# Patient Record
Sex: FEMALE | Race: WHITE | NOT HISPANIC OR LATINO | Employment: FULL TIME | URBAN - METROPOLITAN AREA
[De-identification: names, ages, dates, MRNs, and addresses within clinical notes are randomized per-mention and may not be internally consistent; named-entity substitution may affect disease eponyms.]

---

## 2017-04-17 ENCOUNTER — HOSPITAL ENCOUNTER (OUTPATIENT)
Dept: RADIOLOGY | Age: 42
Discharge: HOME | End: 2017-04-17
Payer: COMMERCIAL

## 2017-04-17 DIAGNOSIS — Z15.01 GENETIC SUSCEPTIBILITY TO MALIGNANT NEOPLASM OF BREAST: ICD-10-CM

## 2017-04-17 DIAGNOSIS — C50.412 MALIGNANT NEOPLASM OF UPPER-OUTER QUADRANT OF LEFT FEMALE BREAST (HCC): ICD-10-CM

## 2017-04-17 PROCEDURE — 77080 DXA BONE DENSITY AXIAL: CPT

## 2017-04-21 ENCOUNTER — TRANSCRIBE ORDERS (OUTPATIENT)
Dept: ADMINISTRATIVE | Facility: HOSPITAL | Age: 42
End: 2017-04-21

## 2017-04-21 ENCOUNTER — ALLSCRIPTS OFFICE VISIT (OUTPATIENT)
Dept: OTHER | Facility: OTHER | Age: 42
End: 2017-04-21

## 2017-04-21 DIAGNOSIS — C50.912 MALIGNANT NEOPLASM OF LEFT FEMALE BREAST, UNSPECIFIED SITE OF BREAST: Primary | ICD-10-CM

## 2017-05-01 ENCOUNTER — ALLSCRIPTS OFFICE VISIT (OUTPATIENT)
Dept: OTHER | Facility: OTHER | Age: 42
End: 2017-05-01

## 2017-07-14 ENCOUNTER — APPOINTMENT (EMERGENCY)
Dept: CT IMAGING | Facility: HOSPITAL | Age: 42
End: 2017-07-14
Payer: COMMERCIAL

## 2017-07-14 ENCOUNTER — HOSPITAL ENCOUNTER (EMERGENCY)
Facility: HOSPITAL | Age: 42
Discharge: HOME | End: 2017-07-14
Attending: EMERGENCY MEDICINE | Admitting: EMERGENCY MEDICINE
Payer: COMMERCIAL

## 2017-07-14 VITALS
SYSTOLIC BLOOD PRESSURE: 144 MMHG | TEMPERATURE: 99 F | OXYGEN SATURATION: 99 % | DIASTOLIC BLOOD PRESSURE: 76 MMHG | HEART RATE: 74 BPM | RESPIRATION RATE: 18 BRPM | WEIGHT: 160 LBS
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
## 2017-07-14 DIAGNOSIS — R07.89 CHEST WALL PAIN: ICD-10-CM

## 2017-07-14 LAB
ANION GAP BLD CALC-SCNC: 18 MMOL/L (ref 4–13)
ATRIAL RATE: 76 BPM
BUN BLD-MCNC: 6 MG/DL (ref 5–25)
CA-I BLD-SCNC: 1.14 MMOL/L (ref 1.12–1.32)
CHLORIDE BLD-SCNC: 101 MMOL/L (ref 100–108)
CREAT BLD-MCNC: 0.7 MG/DL (ref 0.6–1.3)
GFR SERPL CREATININE-BSD FRML MDRD: >60 ML/MIN/1.73SQ M
GLUCOSE SERPL-MCNC: 103 MG/DL (ref 65–140)
HCT VFR BLD CALC: 41 % (ref 34.8–46.1)
HGB BLDA-MCNC: 13.9 G/DL (ref 11.5–15.4)
P AXIS: 50 DEGREES
PCO2 BLD: 26 MMOL/L (ref 21–32)
POTASSIUM BLD-SCNC: 3.9 MMOL/L (ref 3.5–5.3)
PR INTERVAL: 130 MS
QRS AXIS: 77 DEGREES
QRSD INTERVAL: 66 MS
QT INTERVAL: 394 MS
QTC INTERVAL: 443 MS
SODIUM BLD-SCNC: 140 MMOL/L (ref 136–145)
SPECIMEN SOURCE: ABNORMAL
T WAVE AXIS: 50 DEGREES
VENTRICULAR RATE: 76 BPM

## 2017-07-14 PROCEDURE — 80047 BASIC METABLC PNL IONIZED CA: CPT

## 2017-07-14 PROCEDURE — 93005 ELECTROCARDIOGRAM TRACING: CPT | Performed by: EMERGENCY MEDICINE

## 2017-07-14 PROCEDURE — 99284 EMERGENCY DEPT VISIT MOD MDM: CPT

## 2017-07-14 PROCEDURE — 85014 HEMATOCRIT: CPT

## 2017-07-14 PROCEDURE — 96374 THER/PROPH/DIAG INJ IV PUSH: CPT

## 2017-07-14 PROCEDURE — 71260 CT THORAX DX C+: CPT

## 2017-07-14 RX ORDER — KETOROLAC TROMETHAMINE 30 MG/ML
15 INJECTION, SOLUTION INTRAMUSCULAR; INTRAVENOUS ONCE
Status: COMPLETED | OUTPATIENT
Start: 2017-07-14 | End: 2017-07-14

## 2017-07-14 RX ORDER — NAPROXEN 500 MG/1
500 TABLET ORAL 2 TIMES DAILY WITH MEALS
Qty: 20 TABLET | Refills: 0 | Status: SHIPPED | OUTPATIENT
Start: 2017-07-14 | End: 2018-04-27

## 2017-07-14 RX ORDER — VENLAFAXINE HYDROCHLORIDE 75 MG/1
75 CAPSULE, EXTENDED RELEASE ORAL DAILY
COMMUNITY

## 2017-07-14 RX ORDER — ANASTROZOLE 1 MG/1
1 TABLET ORAL DAILY
COMMUNITY
End: 2018-07-09 | Stop reason: SDUPTHER

## 2017-07-14 RX ADMIN — IOHEXOL 85 ML: 350 INJECTION, SOLUTION INTRAVENOUS at 13:20

## 2017-07-14 RX ADMIN — KETOROLAC TROMETHAMINE 15 MG: 30 INJECTION, SOLUTION INTRAMUSCULAR at 12:42

## 2017-08-07 ENCOUNTER — ALLSCRIPTS OFFICE VISIT (OUTPATIENT)
Dept: OTHER | Facility: OTHER | Age: 42
End: 2017-08-07

## 2017-08-07 DIAGNOSIS — Z98.890 OTHER SPECIFIED POSTPROCEDURAL STATES: ICD-10-CM

## 2017-08-07 DIAGNOSIS — C50.412 MALIGNANT NEOPLASM OF UPPER-OUTER QUADRANT OF LEFT FEMALE BREAST (HCC): ICD-10-CM

## 2017-08-16 ENCOUNTER — TRANSCRIBE ORDERS (OUTPATIENT)
Dept: ADMINISTRATIVE | Facility: HOSPITAL | Age: 42
End: 2017-08-16

## 2017-08-16 DIAGNOSIS — S20.00XA: ICD-10-CM

## 2017-08-16 DIAGNOSIS — I74.4 ARTERY OF EXTREMITY, EMBOLISM AND THROMBOSIS (HCC): Primary | ICD-10-CM

## 2017-08-16 DIAGNOSIS — Z98.890 PERSONAL HISTORY OF BENIGN BREAST BIOPSY: ICD-10-CM

## 2017-08-16 DIAGNOSIS — C50.412 MALIGNANT NEOPLASM OF UPPER-OUTER QUADRANT OF LEFT FEMALE BREAST (HCC): ICD-10-CM

## 2017-08-24 ENCOUNTER — HOSPITAL ENCOUNTER (OUTPATIENT)
Dept: RADIOLOGY | Facility: HOSPITAL | Age: 42
Discharge: HOME | End: 2017-08-24
Attending: SURGERY
Payer: COMMERCIAL

## 2017-08-24 DIAGNOSIS — I74.4 ARTERY OF EXTREMITY, EMBOLISM AND THROMBOSIS (HCC): ICD-10-CM

## 2017-08-24 DIAGNOSIS — C50.412 MALIGNANT NEOPLASM OF UPPER-OUTER QUADRANT OF LEFT FEMALE BREAST (HCC): ICD-10-CM

## 2017-08-24 DIAGNOSIS — Z98.890 PERSONAL HISTORY OF BENIGN BREAST BIOPSY: ICD-10-CM

## 2017-08-24 DIAGNOSIS — S20.00XA: ICD-10-CM

## 2017-08-24 PROCEDURE — C8908 MRI W/O FOL W/CONT, BREAST,: HCPCS

## 2017-08-24 PROCEDURE — 0159T HB CAD BREAST MRI: CPT

## 2017-09-18 ENCOUNTER — GENERIC CONVERSION - ENCOUNTER (OUTPATIENT)
Dept: OTHER | Facility: OTHER | Age: 42
End: 2017-09-18

## 2017-10-20 ENCOUNTER — APPOINTMENT (OUTPATIENT)
Dept: RADIATION ONCOLOGY | Facility: CLINIC | Age: 42
End: 2017-10-20
Attending: RADIOLOGY
Payer: COMMERCIAL

## 2017-10-20 ENCOUNTER — GENERIC CONVERSION - ENCOUNTER (OUTPATIENT)
Dept: OTHER | Facility: OTHER | Age: 42
End: 2017-10-20

## 2017-10-20 PROCEDURE — 99214 OFFICE O/P EST MOD 30 MIN: CPT | Performed by: RADIOLOGY

## 2017-11-06 ENCOUNTER — GENERIC CONVERSION - ENCOUNTER (OUTPATIENT)
Dept: OTHER | Facility: OTHER | Age: 42
End: 2017-11-06

## 2017-12-04 ENCOUNTER — ALLSCRIPTS OFFICE VISIT (OUTPATIENT)
Dept: OTHER | Facility: OTHER | Age: 42
End: 2017-12-04

## 2017-12-06 NOTE — PROGRESS NOTES
Assessment    1  Encounter for routine gynecological examination () (Z86 079)    Discussion/Summary    1  Pap smear deferred due to low risk statusContinue to follow-up with her breast surgeon and oncologist as scheduledDiscussed menopausal symptoms  She will continue Effexor 75 mg daily  She also uses vaginal lubricant as neededReturn to office in 1 year  The patient has the current Goals: Continue preventative screening  The patent has the current Barriers: No barrier  Chief Complaint  annual visit      History of Present Illness  HPI: This is a 47-year-old female  who presents for her annual visit  She has no complaints today  She denies any vaginal bleeding or spotting  She continues to follow-up with her surgeon and medical oncologist  She was diagnosed with breast cancer in  and underwent bilateral mastectomy, chemotherapy and radiation  She is on Effexor for hot flashes which has improved significantly  Patient is sexually active and has been  for over 18 years  Review of Systems   Cardiovascular: no complaints of slow or fast heart rate, no chest pain, no palpitations, no leg claudication or lower extremity edema  Respiratory: no complaints of shortness of breath, no wheezing, no dyspnea on exertion, no orthopnea or PND  Breasts: as noted in HPI  Gastrointestinal: no complaints of abdominal pain, no constipation, no nausea or diarrhea, no vomiting, no bloody stools  Genitourinary: no complaints of dysuria, no incontinence, no pelvic pain, no dysmenorrhea, no vaginal discharge or abnormal vaginal bleeding  Over the past 2 weeks, how often have you been bothered by the following problems? 1 ) Little interest or pleasure in doing things? Not at all   2 ) Feeling down, depressed or hopeless? Not at all   3 ) Trouble falling asleep or sleeping too much? Not at all   4 ) Feeling tired or having little energy? Not at all   5 ) Poor appetite or overeating?  Not at all   6 ) Feeling bad about yourself, or that you are a failure, or have let yourself or your family down? Not at all   7 ) Trouble concentrating on things, such as reading a newspaper or watching television? Not at all   8 ) Moving or speaking so slowly that other people could have noticed, or the opposite, moving or speaking faster than usual? Not at all  How difficult have these problems made it for you to do your work, take care of things at home, or get along with people? Not at all  Score 0     ROS reviewed  OB History  Pregnancy History (Brief):  Prior pregnancies: : 4  Para:  Delivery type: 2 vaginal  Additional pregnancy history details: 2 miscarriage(s)       Active Problems    1  Aftercare involving the use of plastic surgery (V51 8) (Z09)   2  BRCA1 gene mutation positive (V84 01) (Z15 01,Z15 02)   3  Encounter for routine gynecological examination (V72 31) (Z01 419)   4  Female hypogonadism due to aromatase inhibitor therapy (256 39,E933 1) (D29 13,D69  1X5A)   5  Malignant neoplasm of upper-outer quadrant of left female breast (174 4) (C50 412)   6  Prophylactic use of anastrozole (Arimidex) (V07 52) (Z79 811)   7   S/P breast reconstruction, bilateral (V43 82) (B01 007)    Past Medical History   · History of Anxiety disorder (300 00) (F41 9)   · History of Encounter for breast reconstruction following mastectomy (V51 0) (Z42 1)   · History of Endometriosis (617 9) (N80 9)   · Denied: History of Exposure To STD   · History of chemotherapy (V87 41) (Z92 21)   · History of radiation therapy (V15 3) (Z92 3)   · History of Open Wound Of The Breast (879 0)   · History of Open Wound Of The Trunk (879 6)   · History of Oral contraceptive prescribed (V25 01) (Z30 011)   · History of Postoperative examination (V67 00) (Z09)   · History of Symptomatic menopausal or female climacteric states (627 2) (N95 1)   · History of Vaginal mass (625 8) (N89 9)    The active problems and past medical history were reviewed and updated today  Surgical History   · History of Breast Surgery Reconstruction   · History of Breast Surgery Reconstruction Bilateral   · History of Chemotherapeutics (V87 41)   · History of Lap Total Hysterect Uterus < 250g With Removal Of Tubes/Ovary   · History of Modified Radical Mastectomy Left Breast   · History of Simple Mastectomy Right Breast    The surgical history was reviewed and updated today  Family History  Mother    · Family history of Ovarian Cancer (V16 41)  Maternal Aunt    · Family history of Breast Cancer (V16 3)  Family History    · Family history of Breast Surgery Reconstruction With TRAM    The family history was reviewed and updated today  Social History     · Being A Social Drinker   · Denied: History of Drug Use   · Never A Smoker  The social history was reviewed and updated today  Current Meds   1  Anastrozole 1 MG Oral Tablet; take one tablet by mouth daily; Therapy: 85EIR1195 to (Evaluate:07Prp7270)  Requested for: 49MZB2316; Last Rx:15Jun2017 Ordered   2  Calcium TABS; Therapy: (Recorded:08Apr2013) to Recorded   3  Effexor TABS; Therapy: (Recorded:10Mar2015) to Recorded   4  Multi Complete CAPS; Therapy: (Recorded:08Apr2013) to Recorded   5  Vitamin D 1000 UNIT Oral Tablet; Therapy: (Recorded:08Apr2013) to Recorded    Allergies  1  Bactrim TABS    Vitals   Recorded: 11PVA4917 05:38HL   Systolic 741   Diastolic 72   Height 5 ft 6 in   Weight 160 lb    BMI Calculated 25 82   BSA Calculated 1 82       Physical Exam   Constitutional  General appearance: No acute distress, well appearing and well nourished  Pulmonary  Auscultation of lungs: Clear to auscultation  Cardiovascular  Auscultation of heart: Normal rate and rhythm, normal S1 and S2, no murmurs  Genitourinary  External genitalia: Normal and no lesions appreciated  Vagina: Abnormal   Vagina: atrophy  Urethral meatus: Normal    Cervix: Surgically absent  Uterus: Surgically absent  Adnexa/parametria: Surgically absent  Anus, perineum, and rectum: Normal sphincter tone, no masses, and no prolapse  Chest  Breasts: Abnormal  -- Extensive scarring noted bilaterally from prior surgery, bilateral silicone implants  Abdomen  Abdomen: Normal, non-tender, and no organomegaly noted  Future Appointments    Date/Time Provider Specialty Site   04/20/2018 09:00 AM CARL Parson   Hematology [facility]cology [facility]   05/07/2018 02:00  W  [address] Surgical Oncology [facility]       Signatures   Electronically signed by : Linette Vivas DO; Dec  4 2017  3:28PM EST                       (Author)

## 2018-01-13 VITALS
HEIGHT: 66 IN | DIASTOLIC BLOOD PRESSURE: 80 MMHG | WEIGHT: 161.2 LBS | SYSTOLIC BLOOD PRESSURE: 122 MMHG | OXYGEN SATURATION: 99 % | RESPIRATION RATE: 14 BRPM | BODY MASS INDEX: 25.91 KG/M2 | HEART RATE: 92 BPM

## 2018-01-13 VITALS
SYSTOLIC BLOOD PRESSURE: 128 MMHG | RESPIRATION RATE: 14 BRPM | OXYGEN SATURATION: 97 % | BODY MASS INDEX: 25.88 KG/M2 | HEART RATE: 99 BPM | HEIGHT: 66 IN | TEMPERATURE: 98.9 F | DIASTOLIC BLOOD PRESSURE: 84 MMHG | WEIGHT: 161 LBS

## 2018-01-13 VITALS
OXYGEN SATURATION: 99 % | WEIGHT: 156 LBS | HEART RATE: 80 BPM | HEIGHT: 66 IN | DIASTOLIC BLOOD PRESSURE: 86 MMHG | SYSTOLIC BLOOD PRESSURE: 132 MMHG | RESPIRATION RATE: 16 BRPM | TEMPERATURE: 97 F | BODY MASS INDEX: 25.07 KG/M2

## 2018-01-14 VITALS — WEIGHT: 162.38 LBS | HEIGHT: 66 IN | BODY MASS INDEX: 26.09 KG/M2

## 2018-01-22 VITALS
WEIGHT: 163.19 LBS | RESPIRATION RATE: 16 BRPM | HEART RATE: 72 BPM | SYSTOLIC BLOOD PRESSURE: 124 MMHG | BODY MASS INDEX: 26.23 KG/M2 | TEMPERATURE: 98.6 F | HEIGHT: 66 IN | DIASTOLIC BLOOD PRESSURE: 78 MMHG

## 2018-01-23 VITALS
WEIGHT: 160 LBS | DIASTOLIC BLOOD PRESSURE: 72 MMHG | BODY MASS INDEX: 25.71 KG/M2 | SYSTOLIC BLOOD PRESSURE: 122 MMHG | HEIGHT: 66 IN

## 2018-04-08 DIAGNOSIS — C50.919 BREAST CANCER (HCC): Primary | ICD-10-CM

## 2018-04-08 NOTE — PROGRESS NOTES
A critical potassium of 6 6 was called in for the patient  There was a note on the lab apparently that indicated it may have been exposed for a prolonged period of time/hemolyzed  This is what was verbally read back to me from [facility]   I called the patient and informed her I put in a stat CMP to be repeated

## 2018-04-09 ENCOUNTER — APPOINTMENT (OUTPATIENT)
Dept: LAB | Facility: MEDICAL CENTER | Age: 43
End: 2018-04-09
Payer: COMMERCIAL

## 2018-04-09 ENCOUNTER — TRANSCRIBE ORDERS (OUTPATIENT)
Dept: ADMINISTRATIVE | Facility: HOSPITAL | Age: 43
End: 2018-04-09

## 2018-04-09 DIAGNOSIS — Z17.0 MALIGNANT NEOPLASM OF BREAST IN FEMALE, ESTROGEN RECEPTOR POSITIVE, UNSPECIFIED LATERALITY, UNSPECIFIED SITE OF BREAST (HCC): ICD-10-CM

## 2018-04-09 DIAGNOSIS — C50.919 MALIGNANT NEOPLASM OF BREAST IN FEMALE, ESTROGEN RECEPTOR POSITIVE, UNSPECIFIED LATERALITY, UNSPECIFIED SITE OF BREAST (HCC): Primary | ICD-10-CM

## 2018-04-09 DIAGNOSIS — C50.919 MALIGNANT NEOPLASM OF BREAST IN FEMALE, ESTROGEN RECEPTOR POSITIVE, UNSPECIFIED LATERALITY, UNSPECIFIED SITE OF BREAST (HCC): ICD-10-CM

## 2018-04-09 DIAGNOSIS — Z17.0 MALIGNANT NEOPLASM OF BREAST IN FEMALE, ESTROGEN RECEPTOR POSITIVE, UNSPECIFIED LATERALITY, UNSPECIFIED SITE OF BREAST (HCC): Primary | ICD-10-CM

## 2018-04-09 LAB
ANION GAP SERPL CALCULATED.3IONS-SCNC: 2 MMOL/L (ref 4–13)
BUN SERPL-MCNC: 9 MG/DL (ref 5–25)
CALCIUM SERPL-MCNC: 9.2 MG/DL
CHLORIDE SERPL-SCNC: 104 MMOL/L (ref 100–108)
CO2 SERPL-SCNC: 33 MMOL/L (ref 21–32)
CREAT SERPL-MCNC: 0.75 MG/DL (ref 0.6–1.3)
GFR SERPL CREATININE-BSD FRML MDRD: 97 ML/MIN/1.73SQ M
GLUCOSE P FAST SERPL-MCNC: 86 MG/DL (ref 65–99)
POTASSIUM SERPL-SCNC: 4 MMOL/L (ref 3.5–5.3)
SODIUM SERPL-SCNC: 139 MMOL/L (ref 136–145)

## 2018-04-09 PROCEDURE — 36415 COLL VENOUS BLD VENIPUNCTURE: CPT

## 2018-04-09 PROCEDURE — 80048 BASIC METABOLIC PNL TOTAL CA: CPT

## 2018-04-13 ENCOUNTER — HOSPITAL ENCOUNTER (OUTPATIENT)
Dept: RADIOLOGY | Age: 43
Discharge: HOME | End: 2018-04-13
Payer: COMMERCIAL

## 2018-04-13 DIAGNOSIS — C50.912 MALIGNANT NEOPLASM OF LEFT FEMALE BREAST (HCC): ICD-10-CM

## 2018-04-13 PROCEDURE — 71260 CT THORAX DX C+: CPT

## 2018-04-13 PROCEDURE — 74177 CT ABD & PELVIS W/CONTRAST: CPT

## 2018-04-13 RX ADMIN — IOHEXOL 100 ML: 350 INJECTION, SOLUTION INTRAVENOUS at 08:53

## 2018-04-16 DIAGNOSIS — C50.412 MALIGNANT NEOPLASM OF UPPER-OUTER QUADRANT OF LEFT FEMALE BREAST (HCC): ICD-10-CM

## 2018-04-27 ENCOUNTER — OFFICE VISIT (OUTPATIENT)
Dept: HEMATOLOGY ONCOLOGY | Facility: CLINIC | Age: 43
End: 2018-04-27
Payer: COMMERCIAL

## 2018-04-27 VITALS
HEIGHT: 66 IN | WEIGHT: 158 LBS | TEMPERATURE: 97.5 F | SYSTOLIC BLOOD PRESSURE: 122 MMHG | OXYGEN SATURATION: 98 % | DIASTOLIC BLOOD PRESSURE: 82 MMHG | RESPIRATION RATE: 16 BRPM | BODY MASS INDEX: 25.39 KG/M2 | HEART RATE: 81 BPM

## 2018-04-27 DIAGNOSIS — Z17.0 MALIGNANT NEOPLASM OF LEFT BREAST IN FEMALE, ESTROGEN RECEPTOR POSITIVE, UNSPECIFIED SITE OF BREAST (HCC): Primary | ICD-10-CM

## 2018-04-27 DIAGNOSIS — C50.912 MALIGNANT NEOPLASM OF LEFT BREAST IN FEMALE, ESTROGEN RECEPTOR POSITIVE, UNSPECIFIED SITE OF BREAST (HCC): Primary | ICD-10-CM

## 2018-04-27 PROCEDURE — 99214 OFFICE O/P EST MOD 30 MIN: CPT | Performed by: INTERNAL MEDICINE

## 2018-04-27 NOTE — PROGRESS NOTES
Hematology / Oncology Outpatient Follow Up Note    Neeta Hauser 38 y o  female :1975 Q:[id number]         Date:  2018    Assessment / Plan:  A 58-year-old surgically postmenopausal woman with clinical stage IIIc locally advanced left breast cancer , grade 3, ER/CO positive HER-2 negative disease  She has BRCA 1 mutation  She had very good partial pathological response with neoadjuvant chemotherapy  She is status post bilateral mastectomy as well as prophylactic bilateral oophorectomy  She is currently on adjuvant hormonal therapy with anastrozole with no significant side effects  Based on her symptomatology, physical examination as well as imaging study, she has no evidence recurrent disease  I recommended her to continue with anastrozole 1 mg once a day  I will see her again in a year with CT scan of chest abdomen pelvis  We discussed genetic testing on her 2 daughters  They may undergo genetic test in between age of 19-31  She has 1 brother who has a daughter  He may be tested for BRCA gene mutation  She is in agreement with my recommendations  Subjective:     HPI:          Interval History:  A 35year old surgically postmenopausal woman with locally advanced left breast cancer , grade 3, ER/CO positive HER-2 negative disease  She had a clinical stage III C  disease with internal mammary lymph node metastasis based on a PET CT scan when she was first diagnosed  She underwent neoadjuvant chemotherapy with AC followed by paclitaxel resulting in very good partial response  She underwent mastectomy and lymph node dissection which showed small residual disease  She was on tamoxifen until she has a bilateral oophorectomy in 2013  This was done because she has BRCA-1 mutation  There was no evidence of malignancy in the ovaries  Afterwards she switched her hormonal treatment to anastrozole  She came in today for routine followup  She has no new complaint    She her hot flashes is minimal   She denied any bone pain  Her DEXA scan, a year ago was completely normal  She takes calcium vitamin D on a regular basis  Her weight is stable  She has no respiratory symptoms  Her performance status is normal       Objective:     Primary Diagnosis:    1  Locally-advanced left breast cancer, clinical stage IIIc disease, grade 3, ER/AL positive, HER2 negative disease, diagnosed in February of 2012  2  BRCA-1 mutation  Cancer Staging:  Cancer Staging  No matching staging information was found for the patient  Previous Hematologic/ Oncologic Treatment:     1  Neoadjuvant chemotherapy with dose-dense AC x4 followed by weekly paclitaxel, completed in late July 2012,   2  Mastectomy with lymph node dissection as well as right prophylactic mastectomy  3  Postmastectomy radiation therapy completed in November 2012  4  Adjuvant hormonal therapy with tamoxifen 20 mg once a day since October 2012 through early February 2013  5  Prophylactic bilateral oophorectomy and hysterectomy  Current Hematologic/ Oncologic Treatment:      Adjuvant hormonal therapy with anastrozole 1 mg once a day since February 2013  Disease Status:     1  Very good partial pathological response to the neoadjuvant chemotherapy  2  JENNIFER status post mastectomy and left axillary lymph node dissection, as well as prophylactic right mastectomy  3  Prophylactic bilateral oophorectomy in February 2013    Test Results:    Pathology:        Radiology:    CT scan of chest abdomen pelvis in April 2018 showed no evidence of metastatic disease  DEXA scan in April 2017 showed normal bone density  Laboratory:        Physical Exam:      General Appearance:    Alert, oriented        Eyes:    PERRL   Ears:    Normal external ear canals, both ears   Nose:   Nares normal, septum midline   Throat:   Mucosa moist  Pharynx without injection      Neck:   Supple       Lungs:     Clear to auscultation bilaterally   Chest Wall:    No tenderness or deformity    Heart:    Regular rate and rhythm       Abdomen:     Soft, non-tender, bowel sounds +, no organomegaly           Extremities:   Extremities no cyanosis or edema       Skin:   no rash or icterus  Lymph nodes:   Cervical, supraclavicular, and axillary nodes normal   Neurologic:   CNII-XII intact, normal strength, sensation and reflexes     Throughout          Breast exam:   status post bilateral mastectomy with reconstruction  No palpable abnormality in either reconstructed breast or chest wall  ROS: Review of Systems   All other systems reviewed and are negative  Imaging: Ct Chest Abdomen Pelvis W Contrast    Result Date: 4/16/2018  Narrative: CT CHEST, ABDOMEN AND PELVIS WITH IV CONTRAST INDICATION:   History of left breast cancer with bilateral mastectomy chemotherapy and radiation therapy  COMPARISON: CT chest 7/14/2017  CT chest abdomen pelvis 3/31/2016  TECHNIQUE: CT examination of the chest, abdomen and pelvis was performed  Axial, sagittal, and coronal 2D reformatted images were created from the source data and submitted for interpretation  Radiation dose length product (DLP) for this visit:  493 mGy-cm   This examination, like all CT scans performed in the [facility], was performed utilizing techniques to minimize radiation dose exposure, including the use of iterative reconstruction and automated exposure control  IV Contrast:  100 mL of iohexol (OMNIPAQUE)   350 Enteric Contrast: Enteric contrast was administered  FINDINGS: CHEST LUNGS:  Apical and anterior left upper lobe scarring likely secondary to prior radiation therapy no suspicious pulmonary nodules or acute pulmonary findings  No endotracheal or endobronchial lesion  PLEURA:  Unremarkable  HEART/GREAT VESSELS:  Unremarkable for patient's age  MEDIASTINUM AND CARMEL:  Unremarkable  CHEST WALL AND LOWER NECK:   Bilateral breast prostheses  Left axillary surgical dissection clips  ABDOMEN LIVER/BILIARY TREE:  Fatty infiltration  GALLBLADDER:  There are gallstone(s) within the gallbladder, without pericholecystic inflammatory changes  SPLEEN:  Unremarkable  PANCREAS:  Unremarkable  ADRENAL GLANDS:  Unremarkable  KIDNEYS/URETERS:  Unremarkable  No hydronephrosis  STOMACH AND BOWEL:  Distal colonic diverticulosis without acute diverticulitis  APPENDIX:  No findings to suggest appendicitis  ABDOMINOPELVIC CAVITY:  No ascites or free intraperitoneal air  No lymphadenopathy  VESSELS:  Unremarkable for patient's age  PELVIS REPRODUCTIVE ORGANS:  Hysterectomy  URINARY BLADDER:  Unremarkable  ABDOMINAL WALL/INGUINAL REGIONS:  Nodularity throughout the right anterior rectus abdominis musculature slightly less prominent when compared with the CT abdomen pelvis from 2 years earlier OSSEOUS STRUCTURES:  No acute fracture or destructive osseous lesion  Impression: No signs of metastatic disease to the chest abdomen or pelvis  Cholelithiasis without cholecystitis  Distal colonic diverticulosis without acute diverticulitis  Slightly less prominent right anterior rectus abdominis musculature nodularity when compared with a 2016 study  Fatty infiltration of the liver  Right apical and anterior left upper lobe scarring likely on a postradiation basis   Workstation performed: [id number]         Labs:   Lab Results   Component Value Date    WBC 5 07 08/12/2014    HGB 13 9 07/14/2017    HCT 41 3 08/12/2014    MCV 93 08/12/2014     08/12/2014     Lab Results   Component Value Date     04/09/2018    K 4 0 04/09/2018     04/09/2018    CO2 33 (H) 04/09/2018    ANIONGAP 2 (L) 04/09/2018    BUN 9 04/09/2018    CREATININE 0 75 04/09/2018    GLUCOSE 103 07/14/2017    GLUF 86 04/09/2018    CALCIUM 9 2 04/09/2018    AST 22 09/17/2013    ALT 32 09/17/2013    ALKPHOS 67 09/17/2013    PROT 8 2 09/17/2013    BILITOT 0 6 09/17/2013    EGFR 97 04/09/2018         Current Medications: Reviewed  Allergies: Reviewed  PMH/FH/SH:  Reviewed      Vital Sign:    Body surface area is 1 81 meters squared      Wt Readings from Last 3 Encounters:   04/27/18 71 7 kg (158 lb)   12/04/17 72 6 kg (160 lb)   11/06/17 74 kg (163 lb 3 oz)        Temp Readings from Last 3 Encounters:   04/27/18 97 5 °F (36 4 °C) (Tympanic)   11/06/17 98 6 °F (37 °C)   07/14/17 99 °F (37 2 °C) (Temporal)        BP Readings from Last 3 Encounters:   04/27/18 122/82   12/04/17 122/72   11/06/17 124/78         Pulse Readings from Last 3 Encounters:   04/27/18 81   11/06/17 72   10/20/17 92     @LASTSAO2(3)@

## 2018-05-07 PROBLEM — C50.412 MALIGNANT NEOPLASM OF UPPER-OUTER QUADRANT OF LEFT BREAST IN FEMALE, ESTROGEN RECEPTOR POSITIVE (HCC): Status: ACTIVE | Noted: 2018-04-27

## 2018-05-09 ENCOUNTER — OFFICE VISIT (OUTPATIENT)
Dept: SURGICAL ONCOLOGY | Facility: CLINIC | Age: 43
End: 2018-05-09
Payer: COMMERCIAL

## 2018-05-09 VITALS
RESPIRATION RATE: 12 BRPM | HEIGHT: 66 IN | HEART RATE: 80 BPM | WEIGHT: 159 LBS | TEMPERATURE: 98.3 F | DIASTOLIC BLOOD PRESSURE: 90 MMHG | SYSTOLIC BLOOD PRESSURE: 130 MMHG | BODY MASS INDEX: 25.55 KG/M2

## 2018-05-09 DIAGNOSIS — N63.20 LEFT BREAST LUMP: ICD-10-CM

## 2018-05-09 DIAGNOSIS — C50.412 MALIGNANT NEOPLASM OF UPPER-OUTER QUADRANT OF LEFT BREAST IN FEMALE, ESTROGEN RECEPTOR POSITIVE (HCC): Primary | ICD-10-CM

## 2018-05-09 DIAGNOSIS — Z17.0 MALIGNANT NEOPLASM OF UPPER-OUTER QUADRANT OF LEFT BREAST IN FEMALE, ESTROGEN RECEPTOR POSITIVE (HCC): Primary | ICD-10-CM

## 2018-05-09 PROCEDURE — 99213 OFFICE O/P EST LOW 20 MIN: CPT | Performed by: NURSE PRACTITIONER

## 2018-05-09 NOTE — PROGRESS NOTES
Surgical Oncology Follow Up       [address]  [facility] [address]    Melania Brown  1975  [id number]  [address] [facility] [facility]  [facility] SURGICAL ONCOLOGY [facility] [address]    Chief Complaint   Patient presents with    Breast Cancer     6 month follow up       Assessment/Plan:  1  Malignant neoplasm of upper-outer quadrant of left breast in female, estrogen receptor positive ([address] )  - 6 mo follow up visit    2  Left breast lump  - US breast left limited (diagnostic); Future      Discussion/Summary: Patient is a pleasant 38year old female who presents today for a 6 month follow-up visit for left breast cancer diagnosed in March 2012  Her pathology revealed invasive ductal carcinoma, ER 60-70%, NV 35-40%, HER-2 negative  She completed neoadjuvant chemotherapy  She was also found to be positive for the BRCA1 genetic mutation  Therefore, she underwent a bilateral mastectomy, SNB in August 2012 by Dr Anitha Ramos  She did have 1/12 positive lymph nodes  This was high risk on Mammaprint  She did have reconstruction performed by Dr Samnatha Barraza  She then completed postmastectomy radiation therapy  She is currently taking anastrozole  She has also had a prophylactic bilateral oophorectomy  She has no new complaints today- denies headaches, back pain, bone pain, cough, SOB, abdominal pain  On exam, there are some nodular areas along the superior aspect of the left TRAM reconstructed breast  I suspect these areas are scar tissue vs fat necrosis  Examined by Dr Anitha Ramos as well  We will obtain an u/s for further evaluation  Assuming the imaging shows no worrisome findings, we will plan to see the patient back in 6 months, or sooner if the need arises  She was instructed to call with any new concerns or symptoms  All of her questions were answered      History of Present Illness:        Malignant neoplasm of upper-outer quadrant of left breast in female, estrogen receptor positive ([facility] [address] )    2012 Initial Diagnosis     Malignant neoplasm of upper-outer quadrant of left breast in female, estrogen receptor positive ([facility] [address] )    Left breast biopsy  Invasive ductal carcinoma  ER 60-70%  W[place] 35-40%  HER-2 negative            Genomic Testing     Mammaprint High Risk         2012 Genetic Testing     POSITIVE BRCA 1 mutation          - 2012 Chemotherapy     Neoadjuvant chemotherapy with dose-dense AC x4 followed by weekly paclitaxel (Dr Verlena Apgar)         2012 Surgery     Bilateral mastectomies, Bilateral sentinel lymph node biopsy, Left axillary dissection, Removal port-a-cath (Dr Kristen Rm)          - 2012 Radiation     Postmastectomy radiation therapy (Dr Yuly Brito)         10/2012 -  Hormone Therapy     Adjuvant hormonal therapy with tamoxifen 20 mg once a day since 2012 through early 2013  Anastrozole 2013- present         2013 Surgery     Prophylactic bilateral oophorectomy and hysterectomy  (Dr Suzi Ponce)             -Interval History: Patient presents today for a 6 month follow-up visit for left breast cancer diagnosed in 2012  She had a CT scan of the chest abdomen and pelvis performed in 2018 which revealed no evidence of metastatic disease  She has no new complaints today- denies headaches, back pain, bone pain, cough, SOB, abdominal pain  Review of Systems:  Review of Systems   Constitutional: Negative for activity change, appetite change, chills, fatigue, fever and unexpected weight change  HENT: Negative for trouble swallowing  Eyes: Negative for pain, redness and visual disturbance  Respiratory: Negative for cough, shortness of breath and wheezing  Cardiovascular: Negative for chest pain, palpitations and leg swelling  Gastrointestinal: Negative for abdominal pain, constipation, diarrhea, nausea and vomiting     Endocrine: Negative for cold intolerance and heat intolerance  Musculoskeletal: Negative for arthralgias, back pain, gait problem and myalgias  Skin: Negative for color change and rash  Neurological: Negative for dizziness, syncope, light-headedness, numbness and headaches  Hematological: Negative for adenopathy  Psychiatric/Behavioral: Negative for agitation and confusion  All other systems reviewed and are negative  Patient Active Problem List   Diagnosis    Malignant neoplasm of upper-outer quadrant of left breast in female, estrogen receptor positive ([facility] )    Abnormal glucose    Anxiety state    Female hypogonadism due to aromatase inhibitor therapy    Leukocytopenia    Migraine without aura    Preventative health care    Malignant neoplasm of breast (female) [facility])     Past Medical History:   Diagnosis Date    Breast cancer ([facility] )      Past Surgical History:   Procedure Laterality Date    HYSTERECTOMY      MASTECTOMY      RECONSTRUCTION BREAST W/ TRAM FLAP       Family History   Problem Relation Age of Onset    Ovarian cancer Mother     Breast cancer Maternal Aunt      Social History     Social History    Marital status: /Civil Union     Spouse name: N/A    Number of children: N/A    Years of education: N/A     Occupational History    Not on file       Social History Main Topics    Smoking status: Former Smoker    Smokeless tobacco: Not on file    Alcohol use Yes    Drug use: No    Sexual activity: Not on file     Other Topics Concern    Not on file     Social History Narrative    No narrative on file       Current Outpatient Prescriptions:     anastrozole (ARIMIDEX) 1 mg tablet, Take 1 mg by mouth daily, Disp: , Rfl:     CALCIUM PO, Take 1,000 Units by mouth daily, Disp: , Rfl:     cholecalciferol (VITAMIN D3) 1,000 units tablet, Take 1,000 Units by mouth daily, Disp: , Rfl:     Multiple Vitamins-Minerals (MULTI COMPLETE PO), Take 1 tablet by mouth daily, Disp: , Rfl:     venlafaxine (EFFEXOR XR) 75 mg 24 hr capsule, Take 75 mg by mouth daily, Disp: , Rfl:   Allergies   Allergen Reactions    Bactrim [Sulfamethoxazole-Trimethoprim] Rash     Other reaction(s): rash     Vitals:    05/09/18 1317   BP: 130/90   Pulse: 80   Resp: 12   Temp: 98 3 °F (36 8 °C)       Physical Exam   Constitutional: She is oriented to person, place, and time  Vital signs are normal  She appears well-developed and well-nourished  No distress  HENT:   Head: Normocephalic and atraumatic  Neck: Normal range of motion  Cardiovascular: Normal rate, regular rhythm and normal heart sounds  Pulmonary/Chest: Effort normal and breath sounds normal    Right reconstructed breast without masses or skin nodules  Three nodular areas noted at the superior aspect (12:00) of left TRAM reconstructed breast along the incision  I suspect this is scar tissue vs fat necrosis  There is no supraclavicular or axillary lymphadenopathy  Examined by myself and Dr Elyssa Emerson  Abdominal: Soft  Normal appearance  She exhibits no mass  There is no hepatosplenomegaly  There is no tenderness  Musculoskeletal: Normal range of motion  Lymphadenopathy:     She has no axillary adenopathy  Right: No supraclavicular adenopathy present  Left: No supraclavicular adenopathy present  Neurological: She is alert and oriented to person, place, and time  Skin: Skin is warm, dry and intact  No rash noted  She is not diaphoretic  Psychiatric: She has a normal mood and affect  Her speech is normal    Vitals reviewed  Results:    Imaging  Ct Chest Abdomen Pelvis W Contrast    Result Date: 4/16/2018  Narrative: CT CHEST, ABDOMEN AND PELVIS WITH IV CONTRAST INDICATION:   History of left breast cancer with bilateral mastectomy chemotherapy and radiation therapy  COMPARISON: CT chest 7/14/2017  CT chest abdomen pelvis 3/31/2016  TECHNIQUE: CT examination of the chest, abdomen and pelvis was performed   Axial, sagittal, and coronal 2D reformatted images were created from the source data and submitted for interpretation  Radiation dose length product (DLP) for this visit:  493 mGy-cm   This examination, like all CT scans performed in the [facility], was performed utilizing techniques to minimize radiation dose exposure, including the use of iterative reconstruction and automated exposure control  IV Contrast:  100 mL of iohexol (OMNIPAQUE)   350 Enteric Contrast: Enteric contrast was administered  FINDINGS: CHEST LUNGS:  Apical and anterior left upper lobe scarring likely secondary to prior radiation therapy no suspicious pulmonary nodules or acute pulmonary findings  No endotracheal or endobronchial lesion  PLEURA:  Unremarkable  HEART/GREAT VESSELS:  Unremarkable for patient's age  MEDIASTINUM AND CARMEL:  Unremarkable  CHEST WALL AND LOWER NECK:   Bilateral breast prostheses  Left axillary surgical dissection clips  ABDOMEN LIVER/BILIARY TREE:  Fatty infiltration  GALLBLADDER:  There are gallstone(s) within the gallbladder, without pericholecystic inflammatory changes  SPLEEN:  Unremarkable  PANCREAS:  Unremarkable  ADRENAL GLANDS:  Unremarkable  KIDNEYS/URETERS:  Unremarkable  No hydronephrosis  STOMACH AND BOWEL:  Distal colonic diverticulosis without acute diverticulitis  APPENDIX:  No findings to suggest appendicitis  ABDOMINOPELVIC CAVITY:  No ascites or free intraperitoneal air  No lymphadenopathy  VESSELS:  Unremarkable for patient's age  PELVIS REPRODUCTIVE ORGANS:  Hysterectomy  URINARY BLADDER:  Unremarkable  ABDOMINAL WALL/INGUINAL REGIONS:  Nodularity throughout the right anterior rectus abdominis musculature slightly less prominent when compared with the CT abdomen pelvis from 2 years earlier OSSEOUS STRUCTURES:  No acute fracture or destructive osseous lesion  Impression: No signs of metastatic disease to the chest abdomen or pelvis  Cholelithiasis without cholecystitis   Distal colonic diverticulosis without acute diverticulitis  Slightly less prominent right anterior rectus abdominis musculature nodularity when compared with a 2016 study  Fatty infiltration of the liver  Right apical and anterior left upper lobe scarring likely on a postradiation basis  Workstation performed: [id number]       I reviewed the above imaging data  Advance Care Planning/Advance Directives:  Discussed disease status, cancer treatment plans and/or cancer treatment goals with the patient

## 2018-05-11 ENCOUNTER — HOSPITAL ENCOUNTER (OUTPATIENT)
Dept: ULTRASOUND IMAGING | Facility: CLINIC | Age: 43
Discharge: HOME | End: 2018-05-11
Payer: COMMERCIAL

## 2018-05-11 DIAGNOSIS — N63.20 LEFT BREAST LUMP: ICD-10-CM

## 2018-05-11 PROCEDURE — 76642 ULTRASOUND BREAST LIMITED: CPT

## 2018-07-09 DIAGNOSIS — Z17.0 MALIGNANT NEOPLASM OF BREAST IN FEMALE, ESTROGEN RECEPTOR POSITIVE, UNSPECIFIED LATERALITY, UNSPECIFIED SITE OF BREAST (HCC): Primary | ICD-10-CM

## 2018-07-09 DIAGNOSIS — C50.919 MALIGNANT NEOPLASM OF BREAST IN FEMALE, ESTROGEN RECEPTOR POSITIVE, UNSPECIFIED LATERALITY, UNSPECIFIED SITE OF BREAST (HCC): Primary | ICD-10-CM

## 2018-07-09 RX ORDER — ANASTROZOLE 1 MG/1
TABLET ORAL
Qty: 90 TABLET | Refills: 1 | Status: SHIPPED | OUTPATIENT
Start: 2018-07-09 | End: 2019-01-31 | Stop reason: SDUPTHER

## 2018-10-01 ENCOUNTER — OFFICE VISIT (OUTPATIENT)
Dept: PLASTIC SURGERY | Facility: CLINIC | Age: 43
End: 2018-10-01
Payer: COMMERCIAL

## 2018-10-01 VITALS — WEIGHT: 156 LBS | HEIGHT: 66 IN | BODY MASS INDEX: 25.07 KG/M2

## 2018-10-01 DIAGNOSIS — Z42.1 AFTERCARE POSTMASTECTOMY FOR BREAST RECONSTRUCTION: Primary | ICD-10-CM

## 2018-10-01 PROCEDURE — 99214 OFFICE O/P EST MOD 30 MIN: CPT | Performed by: SURGERY

## 2018-10-01 NOTE — PROGRESS NOTES
Assessment/Plan:  Please see HPI  Overall, she is quite pleased with the results, and is content to deferred additional procedures, at least for the time being  From anesthetic standpoint, there is some hollowing of the upper pole of the left breast, as expected, this may benefit from us some autologous fat grafting  She also may benefit from revision of the abdominal donor site scar at some point  Given that she is content as is, at least for the time being, we will see her again in 1 year  If she is interested in proceeding with any revisional procedures prior to that she will give me a call and we will make the arrangements  There are no diagnoses linked to this encounter  Subjective: Follow-up visit     Patient ID: Nora Andino is a 37 y o  female  HPI she presents today in follow-up, status post left breast reconstruction with a tram flap/implant, and right breast implant    The following portions of the patient's history were reviewed and updated as appropriate: allergies, current medications, past family history, past medical history, past social history, past surgical history and problem list     Review of Systems   Constitutional: Negative for chills and fever  HENT: Negative for hearing loss  Eyes: Negative for discharge and visual disturbance  Respiratory: Negative for chest tightness and shortness of breath  Cardiovascular: Negative for chest pain and leg swelling  Gastrointestinal: Negative for constipation, diarrhea and nausea  Genitourinary: Negative for dysuria  Musculoskeletal: Negative for gait problem  Skin: Negative for rash  Allergic/Immunologic: Negative for immunocompromised state  Neurological: Negative for seizures and headaches  Hematological: Does not bruise/bleed easily  Psychiatric/Behavioral: Negative for dysphoric mood  The patient is not nervous/anxious            Objective:      Ht 5' 6" (1 676 m)   Wt 70 8 kg (156 lb)   BMI 25 18 kg/m²          Physical Exam   Constitutional: She appears well-developed  HENT:   Head: Normocephalic  Eyes: Pupils are equal, round, and reactive to light  Neck: Normal range of motion  Pulmonary/Chest: Effort normal    Abdominal: Soft  Musculoskeletal: Normal range of motion  Neurological: She is alert  Skin: Skin is warm     Well-healed left breast tram flap, bilateral breast implants, both breasts are soft, supple and without evidence of capsular contracture

## 2018-10-01 NOTE — LETTER
October 1, 2018     Dominique Liu MD  [address] [place] [id number]    Patient: Nel Cowan   YOB: 1975   Date of Visit: 10/1/2018       Dear Dr Karry Phoenix:    Thank you for referring Nel Cowan to me for evaluation  Below are my notes for this consultation  If you have questions, please do not hesitate to call me  I look forward to following your patient along with you  Sincerely,        Morales Fitzpatrick MD        CC: No Recipients  Morales Fitzpatrick MD  10/1/2018  4:35 PM  Sign at close encounter  Assessment/Plan:  Please see HPI  Overall, she is quite pleased with the results, and is content to deferred additional procedures, at least for the time being  From anesthetic standpoint, there is some hollowing of the upper pole of the left breast, as expected, this may benefit from us some autologous fat grafting  She also may benefit from revision of the abdominal donor site scar at some point  Given that she is content as is, at least for the time being, we will see her again in 1 year  If she is interested in proceeding with any revisional procedures prior to that she will give me a call and we will make the arrangements  There are no diagnoses linked to this encounter  Subjective: Follow-up visit     Patient ID: Nel Cowan is a 37 y o  female  HPI she presents today in follow-up, status post left breast reconstruction with a tram flap/implant, and right breast implant    The following portions of the patient's history were reviewed and updated as appropriate: allergies, current medications, past family history, past medical history, past social history, past surgical history and problem list     Review of Systems   Constitutional: Negative for chills and fever  HENT: Negative for hearing loss  Eyes: Negative for discharge and visual disturbance     Respiratory: Negative for chest tightness and shortness of breath  Cardiovascular: Negative for chest pain and leg swelling  Gastrointestinal: Negative for constipation, diarrhea and nausea  Genitourinary: Negative for dysuria  Musculoskeletal: Negative for gait problem  Skin: Negative for rash  Allergic/Immunologic: Negative for immunocompromised state  Neurological: Negative for seizures and headaches  Hematological: Does not bruise/bleed easily  Psychiatric/Behavioral: Negative for dysphoric mood  The patient is not nervous/anxious  Objective:      Ht 5' 6" (1 676 m)   Wt 70 8 kg (156 lb)   BMI 25 18 kg/m²           Physical Exam   Constitutional: She appears well-developed  HENT:   Head: Normocephalic  Eyes: Pupils are equal, round, and reactive to light  Neck: Normal range of motion  Pulmonary/Chest: Effort normal    Abdominal: Soft  Musculoskeletal: Normal range of motion  Neurological: She is alert  Skin: Skin is warm     Well-healed left breast tram flap, bilateral breast implants, both breasts are soft, supple and without evidence of capsular contracture

## 2018-11-09 ENCOUNTER — OFFICE VISIT (OUTPATIENT)
Dept: SURGICAL ONCOLOGY | Facility: CLINIC | Age: 43
End: 2018-11-09
Payer: COMMERCIAL

## 2018-11-09 VITALS
TEMPERATURE: 98.5 F | HEIGHT: 66 IN | DIASTOLIC BLOOD PRESSURE: 80 MMHG | HEART RATE: 81 BPM | SYSTOLIC BLOOD PRESSURE: 110 MMHG | RESPIRATION RATE: 18 BRPM | BODY MASS INDEX: 25.07 KG/M2 | WEIGHT: 156 LBS

## 2018-11-09 DIAGNOSIS — Z15.09 BRCA1 GENE MUTATION POSITIVE: ICD-10-CM

## 2018-11-09 DIAGNOSIS — Z17.0 MALIGNANT NEOPLASM OF UPPER-OUTER QUADRANT OF LEFT BREAST IN FEMALE, ESTROGEN RECEPTOR POSITIVE (HCC): Primary | ICD-10-CM

## 2018-11-09 DIAGNOSIS — C50.412 MALIGNANT NEOPLASM OF UPPER-OUTER QUADRANT OF LEFT BREAST IN FEMALE, ESTROGEN RECEPTOR POSITIVE (HCC): Primary | ICD-10-CM

## 2018-11-09 DIAGNOSIS — Z15.01 BRCA1 GENE MUTATION POSITIVE: ICD-10-CM

## 2018-11-09 PROCEDURE — 99213 OFFICE O/P EST LOW 20 MIN: CPT | Performed by: NURSE PRACTITIONER

## 2018-11-09 NOTE — PROGRESS NOTES
Surgical Oncology Follow Up       [address] [facility] SURGICAL ONCOLOGY [place]  [address] [facility] [address]    Miladis Avendano  1975  [id number]  [facility] SURGICAL ONCOLOGY [place]  [address] [address]    Chief Complaint   Patient presents with    Breast Cancer     Pt is here for 6 month follow up        Assessment/Plan:  1  Malignant neoplasm of upper-outer quadrant of left breast in female, estrogen receptor positive ([address] )  - 6 mo f/u visit    2  BRCA1 gene mutation positive  - 6 mo f/u visit       Discussion/Summary: Patient is a pleasant 37year old female who presents today for a 6 month follow-up visit for left breast cancer diagnosed in March 2012  Her pathology revealed invasive ductal carcinoma, ER 60-70%, CA 35-40%, HER-2 negative  She completed neoadjuvant chemotherapy  She was also found to be positive for the BRCA1 genetic mutation  Therefore, she underwent a bilateral mastectomy, SNB in August 2012 by Dr Vivian Morales  She did have 1/12 positive lymph nodes  This was high risk on Mammaprint  She did have reconstruction performed by Dr Graeme Barrios  She then completed postmastectomy radiation therapy  She is currently taking anastrozole  She had a prophylactic bilateral oophorectomy  She has no new complaints today- denies headaches, back pain, bone pain, cough, SOB, abdominal pain  On exam, there are stable nodular areas along the superior aspect of the left TRAM reconstructed breast- an u/s was performed of this area in May 2018 revealing near simple cysts  Patient doesn't believe that they are changed in size and they are not bothersome to her  Instructed her to monitor and call with changes  We will plan to see the patient back in 6 months, or sooner if the need arises  She was instructed to call with any new concerns or symptoms  All of her questions were answered      History of Present Illness:        Malignant neoplasm of upper-outer quadrant of left breast in female, estrogen receptor positive ([facility] [address] )    2/1/2012 Initial Diagnosis     Malignant neoplasm of upper-outer quadrant of left breast in female, estrogen receptor positive ([facility] [address] )    Left breast biopsy  Invasive ductal carcinoma  ER 60-70%  [place] 35-40%  HER-2 negative            Genomic Testing     Mammaprint High Risk         2/9/2012 Genetic Testing     POSITIVE BRCA 1 mutation          - 7/2012 Chemotherapy     Neoadjuvant chemotherapy with dose-dense AC x4 followed by weekly paclitaxel (Dr Agustina Loco)         8/28/2012 Surgery     Bilateral mastectomies, Bilateral sentinel lymph node biopsy, Left axillary dissection, Removal port-a-cath (Dr Lizet Perry)          - 11/2012 Radiation     Postmastectomy radiation therapy (Dr Fernand Hashimoto)         10/2012 -  Hormone Therapy     Adjuvant hormonal therapy with tamoxifen 20 mg once a day since October 2012 through early February 2013  Anastrozole February 2013- present         2/1/2013 Surgery     Prophylactic bilateral oophorectomy and hysterectomy  (Dr Ramya Goyal)             -Interval History: Patient presents today for a 6 mo f/u visit for left breast cancer diagnosed in 2012  She has no new complaints today  Notices no changes on self exam  Recently met with Dr Anabela Dickens and is not planning on pursuing any further reconstruction at this time  Denies headaches, back pain, bone pain, cough, SOB, abdominal pain  She continues to take Anastrozole  Review of Systems:  Review of Systems   Constitutional: Negative for activity change, appetite change, chills, fatigue, fever and unexpected weight change  HENT: Negative for trouble swallowing  Eyes: Negative for pain, redness and visual disturbance  Respiratory: Negative for cough, shortness of breath and wheezing  Cardiovascular: Negative for chest pain, palpitations and leg swelling  Gastrointestinal: Negative for abdominal pain, constipation, diarrhea, nausea and vomiting  Endocrine: Negative for cold intolerance and heat intolerance  Musculoskeletal: Negative for arthralgias, back pain, gait problem and myalgias  Skin: Negative for color change and rash  Neurological: Negative for dizziness, syncope, light-headedness, numbness and headaches  Hematological: Negative for adenopathy  Psychiatric/Behavioral: Negative for agitation and confusion  All other systems reviewed and are negative  Patient Active Problem List   Diagnosis    Malignant neoplasm of upper-outer quadrant of left breast in female, estrogen receptor positive ([place] [address] )    Abnormal glucose    Anxiety state    Female hypogonadism due to aromatase inhibitor therapy    Leukocytopenia    Migraine without aura    Preventative health care    Malignant neoplasm of breast (female) ([place] [address] )    BRCA1 gene mutation positive     Past Medical History:   Diagnosis Date    Breast cancer ([address] [address] )      Past Surgical History:   Procedure Laterality Date    HYSTERECTOMY      MASTECTOMY      RECONSTRUCTION BREAST W/ TRAM FLAP       Family History   Problem Relation Age of Onset    Ovarian cancer Mother     Breast cancer Maternal Aunt      Social History     Social History    Marital status: /Civil Union     Spouse name: N/A    Number of children: N/A    Years of education: N/A     Occupational History    Not on file       Social History Main Topics    Smoking status: Former Smoker    Smokeless tobacco: Never Used    Alcohol use Yes    Drug use: No    Sexual activity: Not on file     Other Topics Concern    Not on file     Social History Narrative    No narrative on file       Current Outpatient Prescriptions:     anastrozole (ARIMIDEX) 1 mg tablet, TAKE ONE TABLET BY MOUTH ONCE DAILY, Disp: 90 tablet, Rfl: 1    CALCIUM PO, Take 1,000 Units by mouth daily, Disp: , Rfl:     cholecalciferol (VITAMIN D3) 1,000 units tablet, Take 1,000 Units by mouth daily, Disp: , Rfl:     Multiple Vitamins-Minerals (MULTI COMPLETE PO), Take 1 tablet by mouth daily, Disp: , Rfl:     venlafaxine (EFFEXOR XR) 75 mg 24 hr capsule, Take 75 mg by mouth daily, Disp: , Rfl:   Allergies   Allergen Reactions    Bactrim [Sulfamethoxazole-Trimethoprim] Rash     Other reaction(s): rash     Vitals:    11/09/18 0841   BP: 110/80   Pulse: 81   Resp: 18   Temp: 98 5 °F (36 9 °C)       Physical Exam   Constitutional: She is oriented to person, place, and time  Vital signs are normal  She appears well-developed and well-nourished  No distress  HENT:   Head: Normocephalic and atraumatic  Neck: Normal range of motion  Cardiovascular: Normal rate, regular rhythm and normal heart sounds  Pulmonary/Chest: Effort normal and breath sounds normal    Right reconstructed breast without masses or skin nodules  Three nodular areas noted at the superior aspect (12:00) of left TRAM reconstructed breast along the incision- these are stable  U/s of this area performed on 5/11/18- near simple cysts noted  Patient does not appreciate any changes and they are not bothersome to her  There is no supraclavicular or axillary lymphadenopathy  Examined by myself and Dr Kaela Ferguson  Abdominal: Soft  Normal appearance  She exhibits no mass  There is no hepatosplenomegaly  There is no tenderness  Musculoskeletal: Normal range of motion  Lymphadenopathy:     She has no axillary adenopathy  Right: No supraclavicular adenopathy present  Left: No supraclavicular adenopathy present  Neurological: She is alert and oriented to person, place, and time  Skin: Skin is warm, dry and intact  No rash noted  She is not diaphoretic  Psychiatric: She has a normal mood and affect  Her speech is normal    Vitals reviewed  Advance Care Planning/Advance Directives:  Discussed disease status, cancer treatment plans and/or cancer treatment goals with the patient

## 2018-12-17 ENCOUNTER — ANNUAL EXAM (OUTPATIENT)
Dept: OBGYN CLINIC | Facility: CLINIC | Age: 43
End: 2018-12-17
Payer: COMMERCIAL

## 2018-12-17 VITALS
WEIGHT: 155.8 LBS | SYSTOLIC BLOOD PRESSURE: 142 MMHG | BODY MASS INDEX: 25.04 KG/M2 | HEIGHT: 66 IN | DIASTOLIC BLOOD PRESSURE: 90 MMHG

## 2018-12-17 DIAGNOSIS — Z01.419 ENCOUNTER FOR ANNUAL ROUTINE GYNECOLOGICAL EXAMINATION: Primary | ICD-10-CM

## 2018-12-17 PROCEDURE — 99396 PREV VISIT EST AGE 40-64: CPT | Performed by: OBSTETRICS & GYNECOLOGY

## 2018-12-17 NOTE — PROGRESS NOTES
Assessment/Plan:    Pap smear deferred due to low risk status  Encouraged self-breast examination as well as calcium supplementation  She will continue to follow-up with her surgeon and oncologist on a regular basis, every year  We did discuss titrating down Effexor as she was using this for hot flashes and night sweats  She now feels irritability is more stable with medication  This has been prescribed through her primary care physician  Return to office in 1 year or p r n  No problem-specific Assessment & Plan notes found for this encounter  Diagnoses and all orders for this visit:    Encounter for annual routine gynecological examination          Subjective:      Patient ID: Kathrin Maurice is a 37 y o  female  HPI     This is a 40-year-old female  ( x2, age 13, 6) presents for her annual gyn exam   Patient was diagnosed with breast cancer in  where she underwent bilateral mastectomies, chemotherapy and radiation  She has had multiple reconstructive breast surgeries  She is now on Arimidex  She continues to follow-up with her surgeon, oncologist as well as plastic surgeon on an annual basis  After her surgery she was diagnosed with BRCA 1 gene mutation and subsequently underwent LAVH BSO 2013  Patient denies any changes in bowel or bladder function  She has been in a monogamous relationship for over 19 years  All her Pap smears have been normal     The following portions of the patient's history were reviewed and updated as appropriate: allergies, current medications, past family history, past medical history, past social history, past surgical history and problem list     Review of Systems   Constitutional: Negative for fatigue, fever and unexpected weight change  Respiratory: Negative for cough, chest tightness, shortness of breath and wheezing  Cardiovascular: Negative  Negative for chest pain and palpitations  Gastrointestinal: Negative    Negative for abdominal distention, abdominal pain, blood in stool, constipation, diarrhea, nausea and vomiting  Genitourinary: Negative  Negative for difficulty urinating, dyspareunia, dysuria, flank pain, frequency, genital sores, hematuria, pelvic pain, urgency, vaginal bleeding, vaginal discharge and vaginal pain  Skin: Negative for rash  Objective:      /90   Ht 5' 6" (1 676 m)   Wt 70 7 kg (155 lb 12 8 oz)   Breastfeeding? No   BMI 25 15 kg/m²          Physical Exam   Constitutional: She appears well-developed and well-nourished  Cardiovascular: Normal rate and regular rhythm  Pulmonary/Chest: Effort normal and breath sounds normal  Right breast exhibits no inverted nipple, no mass, no nipple discharge, no skin change and no tenderness  Left breast exhibits no inverted nipple, no mass, no nipple discharge, no skin change and no tenderness  Abdominal: Soft  Bowel sounds are normal  She exhibits no distension  There is no tenderness  There is no rebound and no guarding  Genitourinary: Vagina normal  There is no lesion on the right labia  There is no lesion on the left labia  Right adnexum displays no mass, no tenderness and no fullness  Left adnexum displays no mass, no tenderness and no fullness  No vaginal discharge found  Genitourinary Comments: The cervix is surgically absent  The cuff is well-supported  The vagina is evident of slight estrogen deficiency

## 2019-01-31 ENCOUNTER — TELEPHONE (OUTPATIENT)
Dept: HEMATOLOGY ONCOLOGY | Facility: CLINIC | Age: 44
End: 2019-01-31

## 2019-01-31 DIAGNOSIS — C50.919 MALIGNANT NEOPLASM OF BREAST IN FEMALE, ESTROGEN RECEPTOR POSITIVE, UNSPECIFIED LATERALITY, UNSPECIFIED SITE OF BREAST (HCC): ICD-10-CM

## 2019-01-31 DIAGNOSIS — Z17.0 MALIGNANT NEOPLASM OF BREAST IN FEMALE, ESTROGEN RECEPTOR POSITIVE, UNSPECIFIED LATERALITY, UNSPECIFIED SITE OF BREAST (HCC): ICD-10-CM

## 2019-01-31 RX ORDER — ANASTROZOLE 1 MG/1
1 TABLET ORAL DAILY
Qty: 90 TABLET | Refills: 1 | Status: SHIPPED | OUTPATIENT
Start: 2019-01-31 | End: 2019-08-19 | Stop reason: SDUPTHER

## 2019-01-31 NOTE — TELEPHONE ENCOUNTER
Patient called needs refill of Anastrozole, has 9 pills left   [address] [facility]

## 2019-03-06 ENCOUNTER — OFFICE VISIT (OUTPATIENT)
Dept: SURGICAL ONCOLOGY | Facility: CLINIC | Age: 44
End: 2019-03-06
Payer: COMMERCIAL

## 2019-03-06 VITALS
TEMPERATURE: 98.6 F | HEIGHT: 66 IN | DIASTOLIC BLOOD PRESSURE: 94 MMHG | RESPIRATION RATE: 14 BRPM | SYSTOLIC BLOOD PRESSURE: 146 MMHG | BODY MASS INDEX: 25.55 KG/M2 | WEIGHT: 159 LBS | HEART RATE: 92 BPM

## 2019-03-06 DIAGNOSIS — R19.01 RIGHT UPPER QUADRANT ABDOMINAL MASS: Primary | ICD-10-CM

## 2019-03-06 DIAGNOSIS — Z17.0 MALIGNANT NEOPLASM OF UPPER-OUTER QUADRANT OF LEFT BREAST IN FEMALE, ESTROGEN RECEPTOR POSITIVE (HCC): ICD-10-CM

## 2019-03-06 DIAGNOSIS — C50.412 MALIGNANT NEOPLASM OF UPPER-OUTER QUADRANT OF LEFT BREAST IN FEMALE, ESTROGEN RECEPTOR POSITIVE (HCC): ICD-10-CM

## 2019-03-06 DIAGNOSIS — Z15.09 BRCA1 GENE MUTATION POSITIVE: ICD-10-CM

## 2019-03-06 DIAGNOSIS — Z79.811 USE OF ANASTROZOLE (ARIMIDEX): ICD-10-CM

## 2019-03-06 DIAGNOSIS — Z15.01 BRCA1 GENE MUTATION POSITIVE: ICD-10-CM

## 2019-03-06 PROCEDURE — 99213 OFFICE O/P EST LOW 20 MIN: CPT | Performed by: NURSE PRACTITIONER

## 2019-03-06 NOTE — PROGRESS NOTES
Surgical Oncology Follow Up       [address]  [facility] [address] [address]    Anabela Horta  1975  [id number]  [address] [facility],[address] [facility]  [facility]  [address] [address]    Chief Complaint   Patient presents with    Follow-up       Assessment/Plan:  1  Right upper quadrant abdominal mass  - CT scan now- will call with results/further recommendations    2  Malignant neoplasm of upper-outer quadrant of left breast in female, estrogen receptor positive ([facility] [address] )  - 6 mo f/u visit    3  BRCA1 gene mutation positive    4  Use of anastrozole (Arimidex)  - Continue use     Discussion/Summary: Patient is a pleasant 37year old female who presents today with complaints of an enlarging right abdominal lump  She was diagnosed with  left breast cancer diagnosed in March 2012  Her pathology revealed invasive ductal carcinoma, ER 60-70%, TN 35-40%, HER-2 negative  She completed neoadjuvant chemotherapy  She  tested positive for the BRCA1 genetic mutation  Therefore, she underwent a bilateral mastectomy, SNB in August 2012 by Dr Breanna Coronado  She did have 1/12 positive lymph nodes  This was high risk on Mammaprint  She did have reconstruction performed by Dr Lulú Kemp then completed postmastectomy radiation therapy  She is currently taking anastrozole  She had a prophylactic bilateral oophorectomy  She has been followed by Dr Taran Woodall with annual CT scans  There has been nodularity of the right anterior rectus abdominis muscle, consistent with postsurgical fibromatosis noted since 2014, consistent with with her TRAM reconstruction  Her most recent CT, performed 4/2018 revealed slightly less prominent nodularity  However, the patient states that she has noticed an enlargement of this region over the past six weeks  She states it is tender and bothersome but not painful   She has no other complaints- denies headaches, back pain, bone pain, cough, SOB, abdominal pain  She does state that she has been exercising for the past two months  I have recommended obtaining another CT at this time for comparison  Examined by myself and Dr Vivian Morales  This most likely represents scarring vs hematoma r/t TRAM reconstruction  If there are changes on imaging, would consider biopsy  There are no other concerns on today's exam  She has stable cysts of the left reconstructed breast  I will await the results of her upcoming CT and I will call the patient with further recommendations at that time  I will tentatively plan to see the patient back in 6 months, or sooner if the need arises  She was instructed to call with any new concerns or symptoms  All of her questions were answered  History of Present Illness:        Malignant neoplasm of upper-outer quadrant of left breast in female, estrogen receptor positive ([facility] [address] )    2/1/2012 Initial Diagnosis     Malignant neoplasm of upper-outer quadrant of left breast in female, estrogen receptor positive ([facility] [address] )    Left breast biopsy  Invasive ductal carcinoma  ER 60-70%  AR 35-40%  HER-2 negative            Genomic Testing     Mammaprint High Risk         2/9/2012 Genetic Testing     POSITIVE BRCA 1 mutation          - 7/2012 Chemotherapy     Neoadjuvant chemotherapy with dose-dense AC x4 followed by weekly paclitaxel (Dr Bernabe Martinez)         8/28/2012 Surgery     Bilateral mastectomies, Bilateral sentinel lymph node biopsy, Left axillary dissection, Removal port-a-cath (Dr Vivian Morales)          - 11/2012 Radiation     Postmastectomy radiation therapy (Dr Krissy Pappas)         10/2012 -  Hormone Therapy     Adjuvant hormonal therapy with tamoxifen 20 mg once a day since October 2012 through early February 2013       Anastrozole February 2013- present         2/1/2013 Surgery     Prophylactic bilateral oophorectomy and hysterectomy  (Dr Coralie Spatz)             -Interval History: Patient presents today for an evaluation of a right abdominal lump  She states that this has been present since TRAM reconstruction, but over the past 6 weeks has enlarged and is bothersome with palpation  She denies headaches, back pain, bone pain, cough, SOB, abdominal pain  She notices no other changes on self exam     Review of Systems:  Review of Systems   Constitutional: Negative for activity change, appetite change, chills, fatigue, fever and unexpected weight change  HENT: Negative for trouble swallowing  Eyes: Negative for pain, redness and visual disturbance  Respiratory: Negative for cough, shortness of breath and wheezing  Cardiovascular: Negative for chest pain, palpitations and leg swelling  Gastrointestinal: Negative for abdominal pain, constipation, diarrhea, nausea and vomiting  Endocrine: Negative for cold intolerance and heat intolerance  Musculoskeletal: Negative for arthralgias, back pain, gait problem and myalgias  Skin: Negative for color change and rash  Neurological: Negative for dizziness, syncope, light-headedness, numbness and headaches  Hematological: Negative for adenopathy  Psychiatric/Behavioral: Negative for agitation and confusion  All other systems reviewed and are negative        Patient Active Problem List   Diagnosis    Malignant neoplasm of upper-outer quadrant of left breast in female, estrogen receptor positive ([facility] )    Abnormal glucose    Anxiety state    Female hypogonadism due to aromatase inhibitor therapy    Leukocytopenia    Migraine without aura    Preventative health care    Malignant neoplasm of breast (female) ([facility] )    BRCA1 gene mutation positive    Right upper quadrant abdominal mass    Use of anastrozole (Arimidex)     Past Medical History:   Diagnosis Date    Breast cancer ([facility] )      Past Surgical History:   Procedure Laterality Date    HYSTERECTOMY      MASTECTOMY      RECONSTRUCTION BREAST W/ TRAM FLAP       Family History   Problem Relation Age of Onset    Ovarian cancer Mother     Breast cancer Maternal Aunt      Social History     Socioeconomic History    Marital status: /Civil Union     Spouse name: Not on file    Number of children: Not on file    Years of education: Not on file    Highest education level: Not on file   Occupational History    Not on file   Social Needs    Financial resource strain: Not on file    Food insecurity:     Worry: Not on file     Inability: Not on file    Transportation needs:     Medical: Not on file     Non-medical: Not on file   Tobacco Use    Smoking status: Former Smoker    Smokeless tobacco: Never Used   Substance and Sexual Activity    Alcohol use:  Yes    Drug use: No    Sexual activity: Not on file   Lifestyle    Physical activity:     Days per week: Not on file     Minutes per session: Not on file    Stress: Not on file   Relationships    Social connections:     Talks on phone: Not on file     Gets together: Not on file     Attends Sikh service: Not on file     Active member of club or organization: Not on file     Attends meetings of clubs or organizations: Not on file     Relationship status: Not on file    Intimate partner violence:     Fear of current or ex partner: Not on file     Emotionally abused: Not on file     Physically abused: Not on file     Forced sexual activity: Not on file   Other Topics Concern    Not on file   Social History Narrative    Not on file       Current Outpatient Medications:     anastrozole (ARIMIDEX) 1 mg tablet, Take 1 tablet (1 mg total) by mouth daily, Disp: 90 tablet, Rfl: 1    CALCIUM PO, Take 1,000 Units by mouth daily, Disp: , Rfl:     cholecalciferol (VITAMIN D3) 1,000 units tablet, Take 1,000 Units by mouth daily, Disp: , Rfl:     Multiple Vitamins-Minerals (MULTI COMPLETE PO), Take 1 tablet by mouth daily, Disp: , Rfl:     venlafaxine (EFFEXOR XR) 75 mg 24 hr capsule, Take 75 mg by mouth daily, Disp: , Rfl:   Allergies   Allergen Reactions  Bactrim [Sulfamethoxazole-Trimethoprim] Rash     Other reaction(s): rash     Vitals:    03/06/19 1111   BP: 146/94   Pulse: 92   Resp: 14   Temp: 98 6 °F (37 °C)       Physical Exam   Constitutional: She is oriented to person, place, and time  Vital signs are normal  She appears well-developed and well-nourished  No distress  HENT:   Head: Normocephalic and atraumatic  Neck: Normal range of motion  Cardiovascular: Normal rate, regular rhythm and normal heart sounds  Pulmonary/Chest: Effort normal and breath sounds normal    Right reconstructed breast without masses or skin nodules  Nodular areas noted at the superior aspect (12:00) of left TRAM reconstructed breast along the incision- these are stable- ultrasound revealed two simple cysts  In 2:00 position, there is a lump in the lateral breast/axillary region which is consistent with a fat lobule on u/s by Dr Lizet Perry today  There is no supraclavicular or axillary lymphadenopathy  No skin changes  Abdominal: Soft  Normal appearance  She exhibits no mass  There is no hepatosplenomegaly  There is no tenderness  3 x 3 cm lump appreciated in the right upper abdomen with a smaller, 1 cm lump appreciated distally and laterally to nodule #1  Musculoskeletal: Normal range of motion  Lymphadenopathy:     She has no axillary adenopathy  Right: No supraclavicular adenopathy present  Left: No supraclavicular adenopathy present  Neurological: She is alert and oriented to person, place, and time  Skin: Skin is warm, dry and intact  No rash noted  She is not diaphoretic  Psychiatric: She has a normal mood and affect  Her speech is normal    Vitals reviewed  Advance Care Planning/Advance Directives:  Discussed disease status, cancer treatment plans and/or cancer treatment goals with the patient

## 2019-03-12 ENCOUNTER — OFFICE VISIT (OUTPATIENT)
Dept: SURGICAL ONCOLOGY | Facility: CLINIC | Age: 44
End: 2019-03-12
Payer: COMMERCIAL

## 2019-03-12 VITALS
WEIGHT: 157 LBS | BODY MASS INDEX: 25.23 KG/M2 | TEMPERATURE: 98.7 F | DIASTOLIC BLOOD PRESSURE: 90 MMHG | SYSTOLIC BLOOD PRESSURE: 150 MMHG | HEART RATE: 70 BPM | HEIGHT: 66 IN | RESPIRATION RATE: 16 BRPM

## 2019-03-12 DIAGNOSIS — T81.41XA ABSCESS INVOLVING SUTURE: Primary | ICD-10-CM

## 2019-03-12 DIAGNOSIS — R19.01 RIGHT UPPER QUADRANT ABDOMINAL MASS: ICD-10-CM

## 2019-03-12 LAB
ALBUMIN SERPL-MCNC: 4.8 G/DL (ref 3.6–5.1)
ALBUMIN/GLOB SERPL: 1.7 (CALC) (ref 1–2.5)
ALP SERPL-CCNC: 56 U/L (ref 33–115)
ALT SERPL-CCNC: 30 U/L (ref 6–29)
AST SERPL-CCNC: 25 U/L (ref 10–35)
BASOPHILS # BLD AUTO: 31 CELLS/UL (ref 0–200)
BASOPHILS NFR BLD AUTO: 0.7 %
BILIRUB SERPL-MCNC: 0.8 MG/DL (ref 0.2–1.2)
BUN SERPL-MCNC: 14 MG/DL (ref 7–25)
BUN/CREAT SERPL: ABNORMAL (CALC) (ref 6–22)
CALCIUM SERPL-MCNC: 10.2 MG/DL (ref 8.6–10.2)
CHLORIDE SERPL-SCNC: 97 MMOL/L (ref 98–110)
CO2 SERPL-SCNC: 31 MMOL/L (ref 20–32)
CREAT SERPL-MCNC: 0.76 MG/DL (ref 0.5–1.1)
EOSINOPHIL # BLD AUTO: 62 CELLS/UL (ref 15–500)
EOSINOPHIL NFR BLD AUTO: 1.4 %
ERYTHROCYTE [DISTWIDTH] IN BLOOD BY AUTOMATED COUNT: 11.8 % (ref 11–15)
GLOBULIN SER CALC-MCNC: 2.9 G/DL (CALC) (ref 1.9–3.7)
GLUCOSE SERPL-MCNC: 97 MG/DL (ref 65–139)
HCT VFR BLD AUTO: 41.3 % (ref 35–45)
HGB BLD-MCNC: 14.8 G/DL (ref 11.7–15.5)
LYMPHOCYTES # BLD AUTO: 1210 CELLS/UL (ref 850–3900)
LYMPHOCYTES NFR BLD AUTO: 27.5 %
MCH RBC QN AUTO: 33.9 PG (ref 27–33)
MCHC RBC AUTO-ENTMCNC: 35.8 G/DL (ref 32–36)
MCV RBC AUTO: 94.5 FL (ref 80–100)
MONOCYTES # BLD AUTO: 625 CELLS/UL (ref 200–950)
MONOCYTES NFR BLD AUTO: 14.2 %
NEUTROPHILS # BLD AUTO: 2473 CELLS/UL (ref 1500–7800)
NEUTROPHILS NFR BLD AUTO: 56.2 %
PLATELET # BLD AUTO: 222 THOUSAND/UL (ref 140–400)
PMV BLD REES-ECKER: 9.2 FL (ref 7.5–12.5)
POTASSIUM SERPL-SCNC: 4.5 MMOL/L (ref 3.5–5.3)
PROT SERPL-MCNC: 7.7 G/DL (ref 6.1–8.1)
RBC # BLD AUTO: 4.37 MILLION/UL (ref 3.8–5.1)
SL AMB EGFR AFRICAN AMERICAN: 110 ML/MIN/1.73M2
SL AMB EGFR NON AFRICAN AMERICAN: 95 ML/MIN/1.73M2
SODIUM SERPL-SCNC: 136 MMOL/L (ref 135–146)
WBC # BLD AUTO: 4.4 THOUSAND/UL (ref 3.8–10.8)

## 2019-03-12 PROCEDURE — 99213 OFFICE O/P EST LOW 20 MIN: CPT | Performed by: NURSE PRACTITIONER

## 2019-03-12 RX ORDER — CEPHALEXIN 500 MG/1
500 CAPSULE ORAL EVERY 6 HOURS SCHEDULED
Qty: 28 CAPSULE | Refills: 0 | Status: SHIPPED | OUTPATIENT
Start: 2019-03-12 | End: 2019-03-19

## 2019-03-12 NOTE — PROGRESS NOTES
Surgical Oncology Follow Up       [address] [place] [address],[address] [facility]  [facility] [address] [address]    Chris Kowalski  1975  [id number]  [address]  [facility] [place]  [address] [place] [address]    Chief Complaint   Patient presents with    abdominal lump     Pt is here for  RUQ abdominal lump        Assessment/Plan:  1  Right upper quadrant abdominal mass    2  Abscess involving suture  - cephalexin (KEFLEX) 500 mg capsule; Take 1 capsule (500 mg total) by mouth every 6 (six) hours for 7 days  Dispense: 28 capsule; Refill: 0  - BID packing at home  - f/u with Dr Victor Every on Monday at 11:15  - Call with fevers/chills/redness/drainage    Discussion/Summary: Patient is a pleasant 34 year old female who presents today with complaints of an enlarging right abdominal lump  She was diagnosed with  left breast cancer diagnosed in March 2012  Her pathology revealed invasive ductal carcinoma, ER 60-70%, VT 35-40%, HER-2 negative  She completed neoadjuvant chemotherapy  She  tested positive for the BRCA1 genetic mutation  Therefore, she underwent a bilateral mastectomy, SNB in August 2012 by Dr Victor Every  She did have 1/12 positive lymph nodes  This was high risk on Mammaprint  She did have reconstruction performed by Dr Janis Miranda then completed postmastectomy radiation therapy  She is currently taking anastrozole  She had a prophylactic bilateral oophorectomy  She has been followed by Dr Giovany Griggs with annual CT scans  There has been nodularity of the right anterior rectus abdominis muscle, consistent with postsurgical fibromatosis noted since 2014, consistent with with her TRAM reconstruction  Her most recent CT, performed 4/2018 revealed slightly less prominent nodularity  The patient came in for an evaluation last week as she felt the nodular area was becoming larger and was slightly tender   I recommended the patient have a repeat CT scan for further evaluation  This was scheduled for 3/15  She called the office today with increasing erythema, tenderness  On today's exam, there was an abscess noted  An I&D was performed and the wound was packed  This most likely represents a suture abscess  I will place the patient on PO Keflex and the patient was instructed to pack the wound BID  She feels she will be able to do this herself at home  We will see her back in 5 days for f/u  She was instructed to call with any fevers, chills, purulent drainage, worsening erythema  She will have her CT on Friday  She is in agreement with this plan  All of her questions were answered  History of Present Illness:        Malignant neoplasm of upper-outer quadrant of left breast in female, estrogen receptor positive ([facility] [address] )    2/1/2012 Initial Diagnosis     Malignant neoplasm of upper-outer quadrant of left breast in female, estrogen receptor positive ([facility] [address] )    Left breast biopsy  Invasive ductal carcinoma  ER 60-70%  WA 35-40%  HER-2 negative            Genomic Testing     Mammaprint High Risk         2/9/2012 Genetic Testing     POSITIVE BRCA 1 mutation          - 7/2012 Chemotherapy     Neoadjuvant chemotherapy with dose-dense AC x4 followed by weekly paclitaxel (Dr Akhil Hatfield)         8/28/2012 Surgery     Bilateral mastectomies, Bilateral sentinel lymph node biopsy, Left axillary dissection, Removal port-a-cath (Dr Sherice Wells)          - 11/2012 Radiation     Postmastectomy radiation therapy (Dr Tierney Enciso)         10/2012 -  Hormone Therapy     Adjuvant hormonal therapy with tamoxifen 20 mg once a day since October 2012 through early February 2013  Anastrozole February 2013- present         2/1/2013 Surgery     Prophylactic bilateral oophorectomy and hysterectomy  (Dr Stefan Orlando)             -Interval History:  Patient presents today as a follow-up visit for increasing erythema and tenderness of her right abdominal wall  Denies fevers, chills  Review of Systems:  Review of Systems   Constitutional: Negative for chills, fatigue and fever  Skin: Positive for color change  Negative for rash  Patient Active Problem List   Diagnosis    Malignant neoplasm of upper-outer quadrant of left breast in female, estrogen receptor positive ([facility] )    Abnormal glucose    Anxiety state    Female hypogonadism due to aromatase inhibitor therapy    Leukocytopenia    Migraine without aura    Preventative health care    Malignant ne[place] of breast ([place]) ([place] [address] )    BRCA1 gene mutation positive    Right upper quadrant abdominal mass    Use of anastrozole (Arimidex)     Past Medical History:   Diagnosis Date    Breast cancer ([place] [address] )      Past Surgical History:   Procedure Laterality Date    HYSTERECTOMY      MASTECTOMY      RECONSTRUCTION BREAST W/ TRAM FLAP       Family History   Problem Relation Age of Onset    Ovarian cancer Mother     Breast cancer Maternal Aunt      Social History     Socioeconomic History    Marital status: /Civil Union     Spouse name: Not on file    Number of children: Not on file    Years of education: Not on file    Highest education level: Not on file   Occupational History    Not on file   Social Needs    Financial resource strain: Not on file    Food insecurity:     Worry: Not on file     Inability: Not on file    Transportation needs:     Medical: Not on file     Non-medical: Not on file   Tobacco Use    Smoking status: Former Smoker    Smokeless tobacco: Never Used   Substance and Sexual Activity    Alcohol use:  Yes    Drug use: No    Sexual activity: Not on file   Lifestyle    Physical activity:     Days per week: Not on file     Minutes per session: Not on file    Stress: Not on file   Relationships    Social connections:     Talks on phone: Not on file     Gets together: Not on file     Attends Zoroastrian service: Not on file     Active member of club or organization: Not on file     Attends meetings of clubs or organizations: Not on file     Relationship status: Not on file    Intimate partner violence:     Fear of current or ex partner: Not on file     Emotionally abused: Not on file     Physically abused: Not on file     Forced sexual activity: Not on file   Other Topics Concern    Not on file   Social History Narrative    Not on file       Current Outpatient Medications:     anastrozole (ARIMIDEX) 1 mg tablet, Take 1 tablet (1 mg total) by mouth daily, Disp: 90 tablet, Rfl: 1    CALCIUM PO, Take 1,000 Units by mouth daily, Disp: , Rfl:     cholecalciferol (VITAMIN D3) 1,000 units tablet, Take 1,000 Units by mouth daily, Disp: , Rfl:     Multiple Vitamins-Minerals (MULTI COMPLETE PO), Take 1 tablet by mouth daily, Disp: , Rfl:     venlafaxine (EFFEXOR XR) 75 mg 24 hr capsule, Take 75 mg by mouth daily, Disp: , Rfl:   Allergies   Allergen Reactions    Bactrim [Sulfamethoxazole-Trimethoprim] Rash     Other reaction(s): rash     Vitals:    03/12/19 1131   BP: 150/90   Pulse: 70   Resp: 16   Temp: 98 7 °F (37 1 °C)       Physical Exam   Constitutional: She is oriented to person, place, and time  She appears well-developed and well-nourished  HENT:   Head: Normocephalic  Pulmonary/Chest: Effort normal    Neurological: She is alert and oriented to person, place, and time  Skin: Skin is warm  Right upper abdominal abscess noted- blanchable erythema present  See photo  After informed consent was obtained, an I&D of abscess was performed by Dr Caridad Ham  1 ml of 2% Lidocaine with epi was injected, incision made with 11 blade scalpel  Exploration of the area revealed several non dissolvable sutures which were removed  No purulent drainage  Wound was packed with 1/4" plain packing, dry gauze applied as dressing  Packing instructions given to patient  Psychiatric: She has a normal mood and affect           Media Information                  Document Information     Clinical Image - Mobile Device      03/12/2019 11:59 AM   Attached To: Office Visit on 3/12/19 with Santhosh Hill, [address], [address] [place] [address] Planning/Advance Directives:  Discussed disease status, cancer treatment plans and/or cancer treatment goals with the patient

## 2019-03-15 ENCOUNTER — HOSPITAL ENCOUNTER (OUTPATIENT)
Dept: RADIOLOGY | Age: 44
Discharge: HOME | End: 2019-03-15
Payer: COMMERCIAL

## 2019-03-15 DIAGNOSIS — Z17.0 MALIGNANT NEOPLASM OF LEFT BREAST IN FEMALE, ESTROGEN RECEPTOR POSITIVE, UNSPECIFIED SITE OF BREAST (HCC): ICD-10-CM

## 2019-03-15 DIAGNOSIS — C50.912 MALIGNANT NEOPLASM OF LEFT BREAST IN FEMALE, ESTROGEN RECEPTOR POSITIVE, UNSPECIFIED SITE OF BREAST (HCC): ICD-10-CM

## 2019-03-15 PROCEDURE — 71260 CT THORAX DX C+: CPT

## 2019-03-15 PROCEDURE — 74177 CT ABD & PELVIS W/CONTRAST: CPT

## 2019-03-15 RX ADMIN — IOHEXOL 100 ML: 350 INJECTION, SOLUTION INTRAVENOUS at 09:39

## 2019-03-18 ENCOUNTER — OFFICE VISIT (OUTPATIENT)
Dept: SURGICAL ONCOLOGY | Facility: CLINIC | Age: 44
End: 2019-03-18
Payer: COMMERCIAL

## 2019-03-18 VITALS
DIASTOLIC BLOOD PRESSURE: 80 MMHG | BODY MASS INDEX: 25.71 KG/M2 | TEMPERATURE: 98.3 F | HEART RATE: 84 BPM | SYSTOLIC BLOOD PRESSURE: 120 MMHG | HEIGHT: 66 IN | RESPIRATION RATE: 16 BRPM | WEIGHT: 160 LBS

## 2019-03-18 DIAGNOSIS — Z15.01 BRCA1 GENE MUTATION POSITIVE: ICD-10-CM

## 2019-03-18 DIAGNOSIS — Z15.09 BRCA1 GENE MUTATION POSITIVE: ICD-10-CM

## 2019-03-18 DIAGNOSIS — Z79.811 USE OF ANASTROZOLE (ARIMIDEX): ICD-10-CM

## 2019-03-18 DIAGNOSIS — T81.41XA ABSCESS INVOLVING SUTURE: Primary | ICD-10-CM

## 2019-03-18 DIAGNOSIS — Z17.0 MALIGNANT NEOPLASM OF UPPER-OUTER QUADRANT OF LEFT BREAST IN FEMALE, ESTROGEN RECEPTOR POSITIVE (HCC): ICD-10-CM

## 2019-03-18 DIAGNOSIS — C50.412 MALIGNANT NEOPLASM OF UPPER-OUTER QUADRANT OF LEFT BREAST IN FEMALE, ESTROGEN RECEPTOR POSITIVE (HCC): ICD-10-CM

## 2019-03-18 PROCEDURE — 99212 OFFICE O/P EST SF 10 MIN: CPT | Performed by: SURGERY

## 2019-03-18 NOTE — PROGRESS NOTES
Surgical Oncology Follow Up       [address]  [facility] [place]  [address] [address]    Everlene Heimlich  1975  [id number]  [address] [facility] [facility]  [facility] [place]  [address] [address]    Chief Complaint   Patient presents with    Follow-up     Pt is here for follow up wound care           Assessment & Plan:     Patient presents for a follow-up for her abdominal wall abscess  She has been packing on a regular basis  Clinical examination demonstrated is it is almost closed though is still possible to pack it some  The wound was repacked she tolerated this well  We will see her back as previously scheduled  She will continue to pack her wound as long as it remains open  Cancer History:        Malignant neoplasm of upper-outer quadrant of left breast in female, estrogen receptor positive ([facility] [address] )    2/1/2012 Initial Diagnosis     Malignant neoplasm of upper-outer quadrant of left breast in female, estrogen receptor positive ([facility] [address] )    Left breast biopsy  Invasive ductal carcinoma  ER 60-70%  AZ 35-40%  HER-2 negative            Genomic Testing     Mammaprint High Risk         2/9/2012 Genetic Testing     POSITIVE BRCA 1 mutation          - 7/2012 Chemotherapy     Neoadjuvant chemotherapy with dose-dense AC x4 followed by weekly paclitaxel (Dr Yayo Henley)         8/28/2012 Surgery     Bilateral mastectomies, Bilateral sentinel lymph node biopsy, Left axillary dissection, Removal port-a-cath (Dr Bola Jauregui)          - 11/2012 Radiation     Postmastectomy radiation therapy (Dr Eusebia Negro)         10/2012 -  Hormone Therapy     Adjuvant hormonal therapy with tamoxifen 20 mg once a day since October 2012 through early February 2013       Anastrozole February 2013- present         2/1/2013 Surgery     Prophylactic bilateral oophorectomy and hysterectomy  (Dr Catherine Avendaño)              Interval History:   See above    Review of Systems:   Review of Systems   All other systems reviewed and are negative  Past Medical History     Patient Active Problem List   Diagnosis    Malignant neoplasm of upper-outer quadrant of left breast in female, estrogen receptor positive ([facility] )    Abnormal glucose    Anxiety state    Female hypogonadism due to aromatase inhibitor therapy    Leukocytopenia    Migraine without aura    Preventative health care    Malignant neoplasm of breast (female) ([facility] )    BRCA1 gene mutation positive    Right upper quadrant abdominal mass    Use of anastrozole (Arimidex)    Abscess involving suture     Past Medical History:   Diagnosis Date    Breast cancer ([place] [id number] )      Past Surgical History:   Procedure Laterality Date    HYSTERECTOMY      MASTECTOMY      RECONSTRUCTION BREAST W/ TRAM FLAP       Family History   Problem Relation Age of Onset    Ovarian cancer Mother     Breast cancer Maternal Aunt      Social History     Socioeconomic History    Marital status: /Civil Union     Spouse name: Not on file    Number of children: Not on file    Years of education: Not on file    Highest education level: Not on file   Occupational History    Not on file   Social Needs    Financial resource strain: Not on file    Food insecurity:     Worry: Not on file     Inability: Not on file    Transportation needs:     Medical: Not on file     Non-medical: Not on file   Tobacco Use    Smoking status: Former Smoker    Smokeless tobacco: Never Used   Substance and Sexual Activity    Alcohol use:  Yes    Drug use: No    Sexual activity: Not on file   Lifestyle    Physical activity:     Days per week: Not on file     Minutes per session: Not on file    Stress: Not on file   Relationships    Social connections:     Talks on phone: Not on file     Gets together: Not on file     Attends Druze service: Not on file     Active member of club or organization: Not on file     Attends meetings of clubs or organizations: Not on file     Relationship status: Not on file    Intimate partner violence:     Fear of current or ex partner: Not on file     Emotionally abused: Not on file     Physically abused: Not on file     Forced sexual activity: Not on file   Other Topics Concern    Not on file   Social History Narrative    Not on file       Current Outpatient Medications:     anastrozole (ARIMIDEX) 1 mg tablet, Take 1 tablet (1 mg total) by mouth daily, Disp: 90 tablet, Rfl: 1    CALCIUM PO, Take 1,000 Units by mouth daily, Disp: , Rfl:     cephalexin (KEFLEX) 500 mg capsule, Take 1 capsule (500 mg total) by mouth every 6 (six) hours for 7 days, Disp: 28 capsule, Rfl: 0    cholecalciferol (VITAMIN D3) 1,000 units tablet, Take 1,000 Units by mouth daily, Disp: , Rfl:     Multiple Vitamins-Minerals (MULTI COMPLETE PO), Take 1 tablet by mouth daily, Disp: , Rfl:     venlafaxine (EFFEXOR XR) 75 mg 24 hr capsule, Take 75 mg by mouth daily, Disp: , Rfl:   Allergies   Allergen Reactions    Bactrim [Sulfamethoxazole-Trimethoprim] Rash     Other reaction(s): rash       Physical Exam:     Vitals:    03/18/19 1120   BP: 120/80   Pulse: 84   Resp: 16   Temp: 98 3 °F (36 8 °C)     Physical Exam   Abdominal:       Examination of the anterior abdomen demonstrates a approximate 4 mm hole  There is no evidence of infection  The wound was repacked         Results/discussion:   I have recommended the patient continue her antibiotics until they run out which will be a couple of days  I would not reassured them  We will see her back as previously scheduled  She has a follow-up appoint with Dr Irlanda Meza  Her CT scan results are currently pending  Advance Care Planning/Advance Directives:  I discussed the disease status, treatment plans and follow-up with the patient

## 2019-03-21 ENCOUNTER — OFFICE VISIT (OUTPATIENT)
Dept: HEMATOLOGY ONCOLOGY | Facility: CLINIC | Age: 44
End: 2019-03-21
Payer: COMMERCIAL

## 2019-03-21 VITALS
WEIGHT: 158 LBS | SYSTOLIC BLOOD PRESSURE: 124 MMHG | BODY MASS INDEX: 25.39 KG/M2 | HEIGHT: 66 IN | HEART RATE: 78 BPM | DIASTOLIC BLOOD PRESSURE: 88 MMHG | RESPIRATION RATE: 18 BRPM | OXYGEN SATURATION: 97 % | TEMPERATURE: 97.8 F

## 2019-03-21 DIAGNOSIS — Z17.0 MALIGNANT NEOPLASM OF UPPER-OUTER QUADRANT OF LEFT BREAST IN FEMALE, ESTROGEN RECEPTOR POSITIVE (HCC): Primary | ICD-10-CM

## 2019-03-21 DIAGNOSIS — C50.412 MALIGNANT NEOPLASM OF UPPER-OUTER QUADRANT OF LEFT BREAST IN FEMALE, ESTROGEN RECEPTOR POSITIVE (HCC): Primary | ICD-10-CM

## 2019-03-21 PROCEDURE — 99214 OFFICE O/P EST MOD 30 MIN: CPT | Performed by: INTERNAL MEDICINE

## 2019-03-21 NOTE — PROGRESS NOTES
Hematology / Oncology Outpatient Follow Up Note    Nel Cowan 37 y o  female :1975 [id number]         Date:  3/21/2019    Assessment / Plan:  A 58-year-old surgically postmenopausal woman with clinical stage IIIc locally advanced left breast cancer , grade 3, ER/PA positive HER-2 negative disease  She has BRCA 1 mutation  She had very good partial pathological response with neoadjuvant chemotherapy  She is status post bilateral mastectomy as well as prophylactic bilateral oophorectomy  She is currently on adjuvant hormonal therapy with anastrozole with no significant side effects  Clinically, she has no evidence recurrent disease  I recommended her to continue with anastrozole 1 mg once a day  She is aware that she is going to stay on adjuvant hormonal therapy for total 10 years  I will see her again in a year with CT scan of chest abdomen pelvis  She is in agreement with my recommendations                              Subjective:      HPI:             Interval History:  A 37year old surgically postmenopausal woman with locally advanced left breast cancer , grade 3, ER/PA positive HER-2 negative disease  She had a clinical stage III C  disease with internal mammary lymph node metastasis based on a PET CT scan when she was first diagnosed  She underwent neoadjuvant chemotherapy with AC followed by paclitaxel resulting in very good partial response  She underwent mastectomy and lymph node dissection which showed small residual disease  She was on tamoxifen until she has a bilateral oophorectomy in 2013  This was done because she has BRCA-1 mutation  There was no evidence of malignancy in the ovaries  Afterwards she switched her hormonal treatment to anastrozole  She came in today for routine followup  Recently, she developed suture abscess which was drained  She has no fever  Otherwise, she feels well  She denied any pain  She has no respiratory symptoms    Recent CT scan showed no evidence of metastatic disease  Her performance status is normal                            Objective:      Primary Diagnosis:     1  Locally-advanced left breast cancer, clinical stage IIIc disease, grade 3, ER/ND positive, HER2 negative disease, diagnosed in February of 2012  2  BRCA-1 mutation       Cancer Staging:  Cancer Staging  No matching staging information was found for the patient         Previous Hematologic/ Oncologic Treatment:      1  Neoadjuvant chemotherapy with dose-dense AC x4 followed by weekly paclitaxel, completed in late July 2012,   2  Mastectomy with lymph node dissection as well as right prophylactic mastectomy  3  Postmastectomy radiation therapy completed in November 2012  4  Adjuvant hormonal therapy with tamoxifen 20 mg once a day since October 2012 through early February 2013  5  Prophylactic bilateral oophorectomy and hysterectomy      Current Hematologic/ Oncologic Treatment:       Adjuvant hormonal therapy with anastrozole 1 mg once a day since February 2013       Disease Status:      1  Very good partial pathological response to the neoadjuvant chemotherapy  2  JENNIFER status post mastectomy and left axillary lymph node dissection, as well as prophylactic right mastectomy  3  Prophylactic bilateral oophorectomy in February 2013     Test Results:     Pathology:           Radiology:     CT scan of chest abdomen pelvis in March 2019 showed no evidence of metastatic disease  DEXA scan in April 2017 showed normal bone density      Laboratory:           Physical Exam:        General Appearance:    Alert, oriented          Eyes:    PERRL   Ears:    Normal external ear canals, both ears   Nose:   Nares normal, septum midline   Throat:   Mucosa moist  Pharynx without injection      Neck:   Supple         Lungs:     Clear to auscultation bilaterally   Chest Wall:    No tenderness or deformity    Heart:    Regular rate and rhythm         Abdomen:     Soft, non-tender, bowel sounds +, no organomegaly               Extremities:   Extremities no cyanosis or edema         Skin:   no rash or icterus  Lymph nodes:   Cervical, supraclavicular, and axillary nodes normal   Neurologic:   CNII-XII intact, normal strength, sensation and reflexes     Throughout             Breast exam:   status post bilateral mastectomy with reconstruction  No palpable abnormality in either reconstructed breast or chest wall  ROS: Review of Systems   All other systems reviewed and are negative  Imaging: Ct Chest Abdomen Pelvis W Contrast    Result Date: 3/19/2019  Narrative: CT CHEST, ABDOMEN AND PELVIS WITH IV CONTRAST INDICATION:   C50 912: Malignant neoplasm of unspecified site of left female breast Z17 0: Estrogen receptor positive status (ER+)  COMPARISON:  CT chest abdomen pelvis dated April 13, 2018  TECHNIQUE: CT examination of the chest, abdomen and pelvis was performed  Axial, sagittal, and coronal 2D reformatted images were created from the source data and submitted for interpretation  Radiation dose length product (DLP) for this visit:  473 mGy-cm   This examination, like all CT scans performed in the [facility], was performed utilizing techniques to minimize radiation dose exposure, including the use of iterative reconstruction and automated exposure control  IV Contrast:  100 mL of iohexol (OMNIPAQUE) Enteric Contrast: Enteric contrast was administered  FINDINGS: CHEST LUNGS:  Apical and anterior left upper lobe scarring likely secondary to prior radiation therapy is unchanged in appearance from the prior exam   There are no new pulmonary nodules  There is no tracheal or endobronchial lesion  PLEURA:  Unremarkable  HEART/GREAT VESSELS:  Unremarkable for patient's age  MEDIASTINUM AND CARMEL:  Unremarkable  CHEST WALL AND LOWER NECK:   There are left axillary surgical clips  Bilateral breast prostheses are present   ABDOMEN LIVER/BILIARY TREE:  Liver is diffusely decreased in density consistent with fatty change  No CT evidence of suspicious hepatic mass  Normal hepatic contours  No biliary dilatation  A few scattered hepatic hypodensities stable from the prior exam likely representing focal fatty infiltration  GALLBLADDER:  There are gallstone(s) within the gallbladder, without pericholecystic inflammatory changes  SPLEEN:  Unremarkable  PANCREAS:  Unremarkable  ADRENAL GLANDS:  Unremarkable  KIDNEYS/URETERS:  Unremarkable  No hydronephrosis  STOMACH AND BOWEL:  There is colonic diverticulosis without evidence of acute diverticulitis  APPENDIX:  A normal appendix was visualized  ABDOMINOPELVIC CAVITY:  No ascites or free intraperitoneal air  No lymphadenopathy  VESSELS:  Unremarkable for patient's age  PELVIS REPRODUCTIVE ORGANS:  Patient is status post hysterectomy  URINARY BLADDER:  Unremarkable  ABDOMINAL WALL/INGUINAL REGIONS:  Unremarkable  OSSEOUS STRUCTURES:  No acute fracture or destructive osseous lesion  Impression: No evidence of metastatic disease to the chest, abdomen or pelvis  Stable right apical and anterior left upper lobe parenchymal scarring likely related to post radiation changes  Cholelithiasis with no pericholecystic inflammatory change  Scattered colonic diverticulosis with no inflammatory changes present to suggest acute diverticulitis   Workstation performed: [id number]         Labs:   Lab Results   Component Value Date    WBC 4 4 03/12/2019    HGB 14 8 03/12/2019    HCT 41 3 03/12/2019    MCV 94 5 03/12/2019     03/12/2019     Lab Results   Component Value Date     08/12/2014    K 4 5 03/12/2019    CL 97 (L) 03/12/2019    CO2 31 03/12/2019    ANIONGAP 5 08/12/2014    BUN 14 03/12/2019    CREATININE 0 75 04/09/2018    GLUCOSE 103 07/14/2017    GLUF 86 04/09/2018    CALCIUM 10 2 03/12/2019    AST 25 03/12/2019    ALT 30 (H) 03/12/2019    ALKPHOS 56 03/12/2019    PROT 8 2 09/17/2013    BILITOT 0 6 09/17/2013    EGFR 97 04/09/2018     Current Medications: Reviewed  Allergies: Reviewed  PMH/FH/SH:  Reviewed      Vital Sign:    Body surface area is 1 81 meters squared      Wt Readings from Last 3 Encounters:   03/21/19 71 7 kg (158 lb)   03/18/19 72 6 kg (160 lb)   03/12/19 71 2 kg (157 lb)        Temp Readings from Last 3 Encounters:   03/21/19 97 8 °F (36 6 °C)   03/18/19 98 3 °F (36 8 °C) (Oral)   03/12/19 98 7 °F (37 1 °C) (Oral)        BP Readings from Last 3 Encounters:   03/21/19 124/88   03/18/19 120/80   03/12/19 150/90         Pulse Readings from Last 3 Encounters:   03/21/19 78   03/18/19 84   03/12/19 70     @LASTSAO2(3)@

## 2019-08-19 ENCOUNTER — TELEPHONE (OUTPATIENT)
Dept: HEMATOLOGY ONCOLOGY | Facility: CLINIC | Age: 44
End: 2019-08-19

## 2019-08-19 DIAGNOSIS — C50.919 MALIGNANT NEOPLASM OF BREAST IN FEMALE, ESTROGEN RECEPTOR POSITIVE, UNSPECIFIED LATERALITY, UNSPECIFIED SITE OF BREAST (HCC): ICD-10-CM

## 2019-08-19 DIAGNOSIS — Z17.0 MALIGNANT NEOPLASM OF BREAST IN FEMALE, ESTROGEN RECEPTOR POSITIVE, UNSPECIFIED LATERALITY, UNSPECIFIED SITE OF BREAST (HCC): ICD-10-CM

## 2019-08-19 RX ORDER — ANASTROZOLE 1 MG/1
1 TABLET ORAL DAILY
Qty: 90 TABLET | Refills: 1 | Status: SHIPPED | OUTPATIENT
Start: 2019-08-19 | End: 2020-03-02

## 2019-09-10 ENCOUNTER — OFFICE VISIT (OUTPATIENT)
Dept: SURGICAL ONCOLOGY | Facility: CLINIC | Age: 44
End: 2019-09-10
Payer: COMMERCIAL

## 2019-09-10 VITALS
SYSTOLIC BLOOD PRESSURE: 120 MMHG | WEIGHT: 154 LBS | TEMPERATURE: 97.8 F | RESPIRATION RATE: 16 BRPM | HEIGHT: 66 IN | DIASTOLIC BLOOD PRESSURE: 80 MMHG | BODY MASS INDEX: 24.75 KG/M2 | HEART RATE: 83 BPM

## 2019-09-10 DIAGNOSIS — Z79.811 USE OF ANASTROZOLE (ARIMIDEX): ICD-10-CM

## 2019-09-10 DIAGNOSIS — C50.412 MALIGNANT NEOPLASM OF UPPER-OUTER QUADRANT OF LEFT BREAST IN FEMALE, ESTROGEN RECEPTOR POSITIVE (HCC): Primary | ICD-10-CM

## 2019-09-10 DIAGNOSIS — Z17.0 MALIGNANT NEOPLASM OF UPPER-OUTER QUADRANT OF LEFT BREAST IN FEMALE, ESTROGEN RECEPTOR POSITIVE (HCC): Primary | ICD-10-CM

## 2019-09-10 PROBLEM — R19.01 RIGHT UPPER QUADRANT ABDOMINAL MASS: Status: RESOLVED | Noted: 2019-03-06 | Resolved: 2019-09-10

## 2019-09-10 PROCEDURE — 99214 OFFICE O/P EST MOD 30 MIN: CPT | Performed by: NURSE PRACTITIONER

## 2019-09-10 NOTE — PROGRESS NOTES
Surgical Oncology Follow Up       [address] [place] [address],[address] [facility]  [facility] [place]  [address] [facility] [address]    Darek Spear  1975  [id number]  [address] [place] [address]  [facility] [place]  [address] [place] [id number]    Chief Complaint   Patient presents with    Breast Cancer     Pt is here for 6 month f/u       Assessment/Plan:  1  Malignant neoplasm of upper-outer quadrant of left breast in female, estrogen receptor positive ([facility] )  - 1 year f/u visit    2  Use of anastrozole (Arimidex)  - Continue use per medical oncology      Discussion/Summary: Patient is a pleasant 47 year old female who presents today with complaints of an enlarging right abdominal lump  She was diagnosed with  left breast cancer diagnosed in March 2012  Her pathology revealed invasive ductal carcinoma, ER 60-70%, RI 35-40%, HER-2 negative  She completed neoadjuvant chemotherapy  She  tested positive for the BRCA1 genetic mutation  Therefore, she underwent a bilateral mastectomy, SNB in August 2012 by Dr Tata Sofia  She did have 1/12 positive lymph nodes  This was high risk on Mammaprint  She did have reconstruction performed by Dr Jah Dalton then completed postmastectomy radiation therapy  She is currently taking anastrozole  She had a prophylactic bilateral oophorectomy  She has no new complaints today aside from a new palpable abdominal lump which is suspicious for another protruding suture from her TRAM reconstruction  I have instructed her to follow up with Dr Jah Harris for this  She notices no changes on self-exam and there are no concerns on today's exam   She does have stable palpable lumps along her incision at the 12 o'clock position of the left reconstructed breast which are simple cysts  There are no concerns on exam   I have instructed her to call if she appreciates any changes or she has any new concerns or symptoms    Otherwise, we will plan to see the patient back in 1 year or sooner if the need arises  She was instructed to call with any new concerns or symptoms prior to that time  All of her questions were answered  History of Present Illness:        Malignant neoplasm of upper-outer quadrant of left breast in female, estrogen receptor positive ([facility] [address] )    2/1/2012 Initial Diagnosis     Malignant neoplasm of upper-outer quadrant of left breast in female, estrogen receptor positive ([facility] [address] )    Left breast biopsy  Invasive ductal carcinoma  ER 60-70%  TX 35-40%  HER-2 negative         Genomic Testing     Mammaprint High Risk      2/9/2012 Genetic Testing     POSITIVE BRCA 1 mutation       - 7/2012 Chemotherapy     Neoadjuvant chemotherapy with dose-dense AC x4 followed by weekly paclitaxel (Dr Pro Licona)      8/28/2012 Surgery     Bilateral mastectomies, Bilateral sentinel lymph node biopsy, Left axillary dissection, Removal port-a-cath (Dr Samia Ponce)       - 11/2012 Radiation     Postmastectomy radiation therapy (Dr Hoa Gonzales)      10/2012 -  Hormone Therapy     Adjuvant hormonal therapy with tamoxifen 20 mg once a day since October 2012 through early February 2013  Anastrozole February 2013- present      2/1/2013 Surgery     Prophylactic bilateral oophorectomy and hysterectomy  (Dr Silvina Meneses)          -Interval History:  Patient presents today for a six-month follow-up visit for left breast cancer diagnosed in 2012  She notices no changes on her self breast exam   She does think that she is able to palpate a no other abdominal suture secondary to her tram reconstruction  This is not bothersome to her  She denies headaches, back pain, bone pain, cough, shortness of breath, abdominal pain  Review of Systems:  Review of Systems   Constitutional: Negative for activity change, appetite change, chills, fatigue, fever and unexpected weight change  HENT: Negative for trouble swallowing  Eyes: Negative for pain, redness and visual disturbance  Respiratory: Negative for cough, shortness of breath and wheezing  Cardiovascular: Negative for chest pain, palpitations and leg swelling  Gastrointestinal: Negative for abdominal pain, constipation, diarrhea, nausea and vomiting  Endocrine: Negative for cold intolerance and heat intolerance  Musculoskeletal: Negative for arthralgias, back pain, gait problem and myalgias  Skin: Negative for color change and rash  Neurological: Negative for dizziness, syncope, light-headedness, numbness and headaches  Hematological: Negative for adenopathy  Psychiatric/Behavioral: Negative for agitation and confusion  All other systems reviewed and are negative        Patient Active Problem List   Diagnosis    Malignant neoplasm of upper-outer quadrant of left breast in female, estrogen receptor positive ([place] [address] )    Abnormal glucose    Anxiety state    Female hypogonadism due to aromatase inhibitor therapy    Leukocytopenia    Migraine without aura    Preventative health care    Malignant neoplasm of breast (female) ([place] [address] )    BRCA1 gene mutation positive    Use of anastrozole (Arimidex)    Abscess involving suture     Past Medical History:   Diagnosis Date    Breast cancer ([place] [address] )      Past Surgical History:   Procedure Laterality Date    HYSTERECTOMY      MASTECTOMY      RECONSTRUCTION BREAST W/ TRAM FLAP       Family History   Problem Relation Age of Onset    Ovarian cancer Mother     Breast cancer Maternal Aunt      Social History     Socioeconomic History    Marital status: /Civil Union     Spouse name: Not on file    Number of children: Not on file    Years of education: Not on file    Highest education level: Not on file   Occupational History    Not on file   Social Needs    Financial resource strain: Not on file    Food insecurity:     Worry: Not on file     Inability: Not on file    Transportation needs:     Medical: Not on file     Non-medical: Not on file   Tobacco Use    Smoking status: Former Smoker    Smokeless tobacco: Never Used   Substance and Sexual Activity    Alcohol use: Yes    Drug use: No    Sexual activity: Not on file   Lifestyle    Physical activity:     Days per week: Not on file     Minutes per session: Not on file    Stress: Not on file   Relationships    Social connections:     Talks on phone: Not on file     Gets together: Not on file     Attends Congregation service: Not on file     Active member of club or organization: Not on file     Attends meetings of clubs or organizations: Not on file     Relationship status: Not on file    Intimate partner violence:     Fear of current or ex partner: Not on file     Emotionally abused: Not on file     Physically abused: Not on file     Forced sexual activity: Not on file   Other Topics Concern    Not on file   Social History Narrative    Not on file       Current Outpatient Medications:     anastrozole (ARIMIDEX) 1 mg tablet, Take 1 tablet (1 mg total) by mouth daily, Disp: 90 tablet, Rfl: 1    CALCIUM PO, Take 1,000 Units by mouth daily, Disp: , Rfl:     cholecalciferol (VITAMIN D3) 1,000 units tablet, Take 1,000 Units by mouth daily, Disp: , Rfl:     Multiple Vitamins-Minerals (MULTI COMPLETE PO), Take 1 tablet by mouth daily, Disp: , Rfl:     venlafaxine (EFFEXOR XR) 75 mg 24 hr capsule, Take 75 mg by mouth daily, Disp: , Rfl:   Allergies   Allergen Reactions    Bactrim [Sulfamethoxazole-Trimethoprim] Rash     Other reaction(s): rash     Vitals:    09/10/19 1331   BP: 120/80   Pulse: 83   Resp: 16   Temp: 97 8 °F (36 6 °C)       Physical Exam   Constitutional: She is oriented to person, place, and time  Vital signs are normal  She appears well-developed and well-nourished  No distress  HENT:   Head: Normocephalic and atraumatic  Neck: Normal range of motion  Cardiovascular: Normal rate, regular rhythm and normal heart sounds     Pulmonary/Chest: Effort normal and breath sounds normal    Right reconstructed breast without masses or skin nodules  Nodular areas noted at the superior aspect (12:00) of left TRAM reconstructed breast along the incision- these are stable- ultrasound revealed three simple cysts  There is no supraclavicular or axillary lymphadenopathy  No skin changes  Abdominal: Soft  Normal appearance  There is no hepatosplenomegaly  There is no tenderness  Musculoskeletal: Normal range of motion  Lymphadenopathy:     She has no axillary adenopathy  Right: No supraclavicular adenopathy present  Left: No supraclavicular adenopathy present  Neurological: She is alert and oriented to person, place, and time  Skin: Skin is warm, dry and intact  No rash noted  She is not diaphoretic  Psychiatric: She has a normal mood and affect  Her speech is normal    Vitals reviewed  Media Information    Left reconstructed breasts 12:00 position, along incision      Document Information     Clinical Image - Mobile Device      09/10/2019 2:18 PM   Attached To: Office Visit on 9/10/19 with Jyoti Goel, [address], [address] Planning/Advance Directives:  Discussed disease status, cancer treatment plans and/or cancer treatment goals with the patient

## 2019-12-30 ENCOUNTER — HOSPITAL ENCOUNTER (OUTPATIENT)
Dept: RADIOLOGY | Facility: HOSPITAL | Age: 44
Discharge: HOME | End: 2019-12-30
Payer: COMMERCIAL

## 2019-12-30 VITALS — WEIGHT: 155 LBS | BODY MASS INDEX: 25.02 KG/M2

## 2019-12-30 DIAGNOSIS — Z98.890 S/P BREAST RECONSTRUCTION, BILATERAL: ICD-10-CM

## 2019-12-30 DIAGNOSIS — Z85.3 HISTORY OF BREAST CANCER: ICD-10-CM

## 2019-12-30 PROCEDURE — C8908 MRI W/O FOL W/CONT, BREAST,: HCPCS

## 2020-01-06 ENCOUNTER — ANNUAL EXAM (OUTPATIENT)
Dept: OBGYN CLINIC | Facility: CLINIC | Age: 45
End: 2020-01-06
Payer: COMMERCIAL

## 2020-01-06 VITALS
WEIGHT: 163.8 LBS | DIASTOLIC BLOOD PRESSURE: 90 MMHG | SYSTOLIC BLOOD PRESSURE: 132 MMHG | HEIGHT: 66 IN | BODY MASS INDEX: 26.33 KG/M2

## 2020-01-06 DIAGNOSIS — Z01.419 ENCOUNTER FOR ANNUAL ROUTINE GYNECOLOGICAL EXAMINATION: Primary | ICD-10-CM

## 2020-01-06 PROCEDURE — 99396 PREV VISIT EST AGE 40-64: CPT | Performed by: OBSTETRICS & GYNECOLOGY

## 2020-01-06 RX ORDER — AMOXICILLIN AND CLAVULANATE POTASSIUM 875; 125 MG/1; MG/1
TABLET, FILM COATED ORAL
COMMUNITY
Start: 2020-01-02 | End: 2021-09-17

## 2020-01-06 NOTE — PROGRESS NOTES
Assessment/Plan:  Pap smear deferred due to low risk status  Encouraged self-breast examination as well as calcium supplementation  Discussed treatment options for vaginal atrophy including over-the-counter lubricants and moisturizers  All questions answered  Patient will continue to follow-up with her surgeon, oncologist, plastic surgeon and family physician as scheduled  Return to office in 1 year or p r n  No problem-specific Assessment & Plan notes found for this encounter  Diagnoses and all orders for this visit:    Encounter for annual routine gynecological examination    Other orders  -     amoxicillin-clavulanate (AUGMENTIN) 875-125 mg per tablet          Subjective:      Patient ID: Areli Singh is a 53 y o  female  HPI     This is a 49-year-old female  ( x2, age 11, 4) presents for annual gyn exam   Patient was diagnosed with breast cancer in  where she underwent bilateral mastectomy, chemotherapy followed by radiation treatment  She has had multiple reconstructive breast surgeries  She is now on Arimidex  Patient denies any vaginal bleeding or spotting  There has been no changes in bowel or bladder function  She underwent LAVH BSO 2013 due to diagnosis of BRCA 1 gene mutation  She still gets occasional hot flashes however this is tolerable  She has been in a monogamous relationship with her  for over 20 years  Her Pap smears have been normal     The following portions of the patient's history were reviewed and updated as appropriate: allergies, current medications, past family history, past medical history, past social history, past surgical history and problem list     Review of Systems   Constitutional: Negative for fatigue, fever and unexpected weight change  Respiratory: Negative for cough, chest tightness, shortness of breath and wheezing  Cardiovascular: Negative  Negative for chest pain and palpitations  Gastrointestinal: Negative    Negative for abdominal distention, abdominal pain, blood in stool, constipation, diarrhea, nausea and vomiting  Genitourinary: Negative  Negative for difficulty urinating, dyspareunia, dysuria, flank pain, frequency, genital sores, hematuria, pelvic pain, urgency, vaginal bleeding, vaginal discharge and vaginal pain  Skin: Negative for rash  Objective:      /90   Ht 5' 6" (1 676 m)   Wt 74 3 kg (163 lb 12 8 oz)   Breastfeeding? No   BMI 26 44 kg/m²          Physical Exam   Constitutional: She appears well-developed and well-nourished  Cardiovascular: Normal rate and regular rhythm  Pulmonary/Chest: Effort normal and breath sounds normal  Right breast exhibits no inverted nipple, no mass, no nipple discharge, no skin change and no tenderness  Left breast exhibits no inverted nipple, no mass, no nipple discharge, no skin change and no tenderness  Bilateral breast implants   Abdominal: Soft  Bowel sounds are normal  She exhibits no distension  There is no tenderness  There is no rebound and no guarding  Genitourinary: Vagina normal  There is no lesion on the right labia  There is no lesion on the left labia  Right adnexum displays no mass, no tenderness and no fullness  Left adnexum displays no mass, no tenderness and no fullness  No vaginal discharge found  Genitourinary Comments: The vagina is evident of estrogen deficiency  The cervix is surgically absent  The cuff is well-supported  Rectovaginal exam is confirmatory

## 2020-01-09 PROBLEM — Z85.3 PERSONAL HISTORY OF MALIGNANT NEOPLASM OF BREAST: Status: ACTIVE | Noted: 2020-01-09

## 2020-01-30 ENCOUNTER — TELEPHONE (OUTPATIENT)
Dept: HEMATOLOGY ONCOLOGY | Facility: CLINIC | Age: 45
End: 2020-01-30

## 2020-01-30 NOTE — TELEPHONE ENCOUNTER
Left VM for patient to let her know that I needed to reschedule her 3/27 appointment because Dr Mackenzie Pino will be out of the office that day  Rescheduled for 4/10 at 1pm  Left call back number if this date/time does not work for the patient

## 2020-03-02 DIAGNOSIS — Z17.0 MALIGNANT NEOPLASM OF BREAST IN FEMALE, ESTROGEN RECEPTOR POSITIVE, UNSPECIFIED LATERALITY, UNSPECIFIED SITE OF BREAST (HCC): ICD-10-CM

## 2020-03-02 DIAGNOSIS — C50.919 MALIGNANT NEOPLASM OF BREAST IN FEMALE, ESTROGEN RECEPTOR POSITIVE, UNSPECIFIED LATERALITY, UNSPECIFIED SITE OF BREAST (HCC): ICD-10-CM

## 2020-03-02 RX ORDER — ANASTROZOLE 1 MG/1
TABLET ORAL
Qty: 90 TABLET | Refills: 1 | Status: SHIPPED | OUTPATIENT
Start: 2020-03-02 | End: 2020-08-31

## 2020-08-31 ENCOUNTER — TELEPHONE (OUTPATIENT)
Dept: HEMATOLOGY ONCOLOGY | Facility: CLINIC | Age: 45
End: 2020-08-31

## 2020-08-31 DIAGNOSIS — Z17.0 MALIGNANT NEOPLASM OF BREAST IN FEMALE, ESTROGEN RECEPTOR POSITIVE, UNSPECIFIED LATERALITY, UNSPECIFIED SITE OF BREAST (HCC): ICD-10-CM

## 2020-08-31 DIAGNOSIS — C50.919 MALIGNANT NEOPLASM OF BREAST IN FEMALE, ESTROGEN RECEPTOR POSITIVE, UNSPECIFIED LATERALITY, UNSPECIFIED SITE OF BREAST (HCC): ICD-10-CM

## 2020-08-31 RX ORDER — ANASTROZOLE 1 MG/1
TABLET ORAL
Qty: 90 TABLET | Refills: 1 | Status: SHIPPED | OUTPATIENT
Start: 2020-08-31 | End: 2021-03-29

## 2020-08-31 NOTE — TELEPHONE ENCOUNTER
Reschedule Appointment     Who is calling in Patient    Doctor Appointment Scheduled with Dr Marline Miller date and time 9/4   New date and time 9/25   Location [place]    Patient verbalized understanding    Yes

## 2020-09-10 ENCOUNTER — TELEPHONE (OUTPATIENT)
Dept: SURGICAL ONCOLOGY | Facility: CLINIC | Age: 45
End: 2020-09-10

## 2020-09-14 ENCOUNTER — OFFICE VISIT (OUTPATIENT)
Dept: SURGICAL ONCOLOGY | Facility: CLINIC | Age: 45
End: 2020-09-14
Payer: COMMERCIAL

## 2020-09-14 VITALS
HEIGHT: 66 IN | SYSTOLIC BLOOD PRESSURE: 160 MMHG | BODY MASS INDEX: 26.36 KG/M2 | TEMPERATURE: 96.7 F | RESPIRATION RATE: 16 BRPM | DIASTOLIC BLOOD PRESSURE: 100 MMHG | HEART RATE: 96 BPM | WEIGHT: 164 LBS

## 2020-09-14 DIAGNOSIS — Z17.0 MALIGNANT NEOPLASM OF UPPER-OUTER QUADRANT OF LEFT BREAST IN FEMALE, ESTROGEN RECEPTOR POSITIVE (HCC): Primary | ICD-10-CM

## 2020-09-14 DIAGNOSIS — Z79.811 USE OF ANASTROZOLE (ARIMIDEX): ICD-10-CM

## 2020-09-14 DIAGNOSIS — Z15.01 BRCA1 GENE MUTATION POSITIVE: ICD-10-CM

## 2020-09-14 DIAGNOSIS — M79.622 LEFT AXILLARY PAIN: ICD-10-CM

## 2020-09-14 DIAGNOSIS — C50.412 MALIGNANT NEOPLASM OF UPPER-OUTER QUADRANT OF LEFT BREAST IN FEMALE, ESTROGEN RECEPTOR POSITIVE (HCC): Primary | ICD-10-CM

## 2020-09-14 DIAGNOSIS — Z15.09 BRCA1 GENE MUTATION POSITIVE: ICD-10-CM

## 2020-09-14 PROCEDURE — 99214 OFFICE O/P EST MOD 30 MIN: CPT | Performed by: NURSE PRACTITIONER

## 2020-09-14 NOTE — PROGRESS NOTES
Surgical Oncology Follow Up       [facility] SURGICAL ONCOLOGY [address]  [facility] [address]    Mikey Turner  1975  [id number]  [facility] SURGICAL ONCOLOGY [place]  [place] [address]    Chief Complaint   Patient presents with    Breast Cancer     Pt is here for 1 year f/u        Assessment/Plan:  1  Malignant neoplasm of upper-outer quadrant of left breast in female, estrogen receptor positive ([address] )  - 1 year f/u visit    2  Use of anastrozole (Arimidex)  - Continue use per medical oncology    3  BRCA1 gene mutation positive  - Ambulatory Referral to Oncology Genetics; Future    4  Left axillary pain  - US breast left limited (diagnostic); Future      Discussion/Summary:  Patient is a 57-year-old female who presents today for follow-up visit for left breast cancer diagnosed in March of 2012  Her pathology revealed invasive ductal carcinoma, ER 60-70%, MI 35-40%, HER-2 negative  She completed neoadjuvant chemotherapy  She  tested positive for the BRCA1 genetic mutation  Therefore, she underwent a bilateral mastectomy, SNB in August 2012 by Dr Judie Leong  She did have 1/12 positive lymph nodes  This was high risk on Mammaprint  She did have reconstruction performed by Dr Kacie Chavira then completed postmastectomy radiation therapy  She is currently taking anastrozole  She had a prophylactic bilateral oophorectomy  Her only complaint today is new left axillary pain for a few weeks  I do not appreciate any worrisome findings on her exam   She has stable superior left reconstructed breast lumps which have been consistent with cysts in the past   Given her new complaint and her history, I will assess with the left axillary ultrasound and also reassessed the breast to ensure stability  I suspect that her pain is muscular as she states it started after she was stating her deck    She will resume stretching exercises as outlined in her breast cancer handbook  I did instruct her to monitor and call if her symptoms do not improve and I will refer to physical therapy  Assuming her imaging is normal, I will plan to see her back in 1 year or sooner should the need arise  I will also refer her to Oncology Genetics for an annual consultation and for information regarding the testing of her children once they turn 18  She was instructed to call with any new concerns prior to her next visit  All of her questions were answered today  History of Present Illness:     Oncology History   Malignant neoplasm of upper-outer quadrant of left breast in female, estrogen receptor positive ([address] )   2/1/2012 Initial Diagnosis    Malignant neoplasm of upper-outer quadrant of left breast in female, estrogen receptor positive ([address] )    Left breast biopsy  Invasive ductal carcinoma  ER 60-70%  CA 35-40%  HER-2 negative        Genomic Testing    Mammaprint High Risk     2/9/2012 Genetic Testing    POSITIVE BRCA 1 mutation      - 7/2012 Chemotherapy    Neoadjuvant chemotherapy with dose-dense AC x4 followed by weekly paclitaxel (Dr Chavez)     8/28/2012 Surgery    Bilateral mastectomies, Bilateral sentinel lymph node biopsy, Left axillary dissection, Removal port-a-cath (Dr Valentino Mcfarlane)      - 11/2012 Radiation    Postmastectomy radiation therapy (Dr Anthony Trinh)     10/2012 -  Hormone Therapy    Adjuvant hormonal therapy with tamoxifen 20 mg once a day since October 2012 through early February 2013  Anastrozole February 2013- present     2/1/2013 Surgery    Prophylactic bilateral oophorectomy and hysterectomy  (Dr Merry Crook)          -Interval History:  Patient presents today for a follow-up visit for left breast cancer diagnosed in 2012  She notices no changes on her self breast exam but does report new left axillary pain for a few weeks  She does state that she was stating her deck    She denies headaches, back pain, bone pain, cough, shortness of breath, abdominal pain  She continues to take anastrozole  She was going to fat grafting to the breast but elected not to have this done  Review of Systems:  Review of Systems   Constitutional: Negative for activity change, appetite change, chills, fatigue, fever and unexpected weight change  HENT: Negative for trouble swallowing  Eyes: Negative for pain, redness and visual disturbance  Respiratory: Negative for cough, shortness of breath and wheezing  Cardiovascular: Negative for chest pain, palpitations and leg swelling  Gastrointestinal: Negative for abdominal pain, constipation, diarrhea, nausea and vomiting  Endocrine: Negative for cold intolerance and heat intolerance  Musculoskeletal: Negative for arthralgias, back pain, gait problem and myalgias  Skin: Negative for color change and rash  Neurological: Negative for dizziness, syncope, light-headedness, numbness and headaches  Hematological: Negative for adenopathy  Psychiatric/Behavioral: Negative for agitation and confusion  All other systems reviewed and are negative        Patient Active Problem List   Diagnosis    Malignant neoplasm of upper-outer quadrant of left breast in female, estrogen receptor positive ([facility] )    Abnormal glucose    Anxiety state    Female hypogonadism due to aromatase inhibitor therapy    Leukocytopenia    Migraine without aura    Preventative health care    Malignant neoplasm of breast (female) ([facility] )    BRCA1 gene mutation positive    Use of anastrozole (Arimidex)    Abscess involving suture    Personal history of malignant neoplasm of breast     Past Medical History:   Diagnosis Date    Breast cancer ([facility] )      Past Surgical History:   Procedure Laterality Date    HYSTERECTOMY      MASTECTOMY      RECONSTRUCTION BREAST W/ TRAM FLAP       Family History   Problem Relation Age of Onset    Ovarian cancer Mother     Breast cancer Maternal Aunt      Social History     Socioeconomic History    Marital status: /Civil Union     Spouse name: Not on file    Number of children: Not on file    Years of education: Not on file    Highest education level: Not on file   Occupational History    Not on file   Social Needs    Financial resource strain: Not on file    Food insecurity     Worry: Not on file     Inability: Not on file    Transportation needs     Medical: Not on file     Non-medical: Not on file   Tobacco Use    Smoking status: Former Smoker    Smokeless tobacco: Never Used   Substance and Sexual Activity    Alcohol use:  Yes    Drug use: No    Sexual activity: Yes     Partners: Male     Birth control/protection: Post-menopausal   Lifestyle    Physical activity     Days per week: Not on file     Minutes per session: Not on file    Stress: Not on file   Relationships    Social connections     Talks on phone: Not on file     Gets together: Not on file     Attends [employer] service: Not on file     Active member of club or organization: Not on file     Attends meetings of clubs or organizations: Not on file     Relationship status: Not on file    Intimate partner violence     Fear of current or ex partner: Not on file     Emotionally abused: Not on file     Physically abused: Not on file     Forced sexual activity: Not on file   Other Topics Concern    Not on file   Social History Narrative    Not on file       Current Outpatient Medications:     anastrozole (ARIMIDEX) 1 mg tablet, Take 1 tablet by mouth once daily, Disp: 90 tablet, Rfl: 1    CALCIUM PO, Take 1,000 Units by mouth daily, Disp: , Rfl:     cholecalciferol (VITAMIN D3) 1,000 units tablet, Take 1,000 Units by mouth daily, Disp: , Rfl:     Multiple Vitamins-Minerals (MULTI COMPLETE PO), Take 1 tablet by mouth daily, Disp: , Rfl:     venlafaxine (EFFEXOR XR) 75 mg 24 hr capsule, Take 75 mg by mouth daily, Disp: , Rfl:     amoxicillin-clavulanate (AUGMENTIN) 875-125 mg per tablet, , Disp: , Rfl:   Allergies Allergen Reactions    Bactrim [Sulfamethoxazole-Trimethoprim] Rash     Other reaction(s): rash     Vitals:    09/14/20 0937   BP: 160/100   Pulse: 96   Resp: 16   Temp: (!) 96 7 °F (35 9 °C)       Physical Exam  Vitals signs reviewed  Constitutional:       General: She is not in acute distress  Appearance: Normal appearance  She is well-developed  She is not diaphoretic  HENT:      Head: Normocephalic and atraumatic  Neck:      Musculoskeletal: Normal range of motion  Cardiovascular:      Rate and Rhythm: Normal rate and regular rhythm  Heart sounds: Normal heart sounds  Pulmonary:      Effort: Pulmonary effort is normal       Breath sounds: Normal breath sounds  Chest:          Comments: Right reconstructed breast without skin changes, or nodules  Left breast with stable palpable lumps at superior aspect of her reconstructed breast (previously imaged as cysts)  There are no dominant masses, skin changes or nodules  There is no bilateral supraclavicular or axillary lymphadenopathy  Mild tenderness with palpation of lateral chest wall musculature  Abdominal:      Palpations: Abdomen is soft  There is no mass  Tenderness: There is no abdominal tenderness  Musculoskeletal: Normal range of motion  Lymphadenopathy:      Upper Body:      Right upper body: No supraclavicular or axillary adenopathy  Left upper body: No supraclavicular or axillary adenopathy  Skin:     General: Skin is warm and dry  Findings: No rash  Neurological:      Mental Status: She is alert and oriented to person, place, and time  Psychiatric:         Speech: Speech normal          Advance Care Planning/Advance Directives:  Discussed disease status, cancer treatment plans and/or cancer treatment goals with the patient

## 2020-09-16 ENCOUNTER — TELEPHONE (OUTPATIENT)
Dept: SURGICAL ONCOLOGY | Facility: CLINIC | Age: 45
End: 2020-09-16

## 2020-09-16 NOTE — TELEPHONE ENCOUNTER
Genetics New Patient Intake Form   Patient Details:     Pao Fuentes    1975    [id number]    Background Information:    "On Hold (Urgent Slot)" is set aside for Pancreatic, Ovarian, and Scheduled Surgery Patients  If it is not scheduled by the previous Friday, any patient can be scheduled in it  Who is calling to schedule? If not self, relationship to patient? SELF   Referring Provider Patricia Valdovinos   Is this a personal or family history? personal   If personal history, what age were you at diagnosis? 36   What is the type of tumor? BREAST    Pancreatic and Ovarian needs to be scheduled in the "On Hold (Urgent Slot)"   Do you have a pending surgery for your cancer diagnosis? No    If yes: needs to be scheduled in the "On Hold (Urgent Slot)"   Referring Provider Department SURG ONC   Did the patient schedule an appointment? Yes   List Appointment Date and Provider Name  [facility]   10/15/2020   Scheduling Information:   Preferred [place]:  [place]   Are there any dates/time the patient cannot be seen?     Miscellaneous:    After completing the above information, please route to Oncology Genetics

## 2020-09-18 ENCOUNTER — HOSPITAL ENCOUNTER (OUTPATIENT)
Dept: ULTRASOUND IMAGING | Facility: CLINIC | Age: 45
Discharge: HOME | End: 2020-09-18
Payer: COMMERCIAL

## 2020-09-18 VITALS — BODY MASS INDEX: 26.36 KG/M2 | HEIGHT: 66 IN | WEIGHT: 164 LBS

## 2020-09-18 DIAGNOSIS — M79.622 LEFT AXILLARY PAIN: ICD-10-CM

## 2020-09-18 PROCEDURE — 76642 ULTRASOUND BREAST LIMITED: CPT

## 2020-09-25 ENCOUNTER — OFFICE VISIT (OUTPATIENT)
Dept: HEMATOLOGY ONCOLOGY | Facility: CLINIC | Age: 45
End: 2020-09-25
Payer: COMMERCIAL

## 2020-09-25 VITALS
RESPIRATION RATE: 18 BRPM | TEMPERATURE: 98 F | DIASTOLIC BLOOD PRESSURE: 80 MMHG | HEIGHT: 66 IN | BODY MASS INDEX: 26.2 KG/M2 | SYSTOLIC BLOOD PRESSURE: 138 MMHG | WEIGHT: 163 LBS | HEART RATE: 78 BPM | OXYGEN SATURATION: 98 %

## 2020-09-25 DIAGNOSIS — C50.412 MALIGNANT NEOPLASM OF UPPER-OUTER QUADRANT OF LEFT BREAST IN FEMALE, ESTROGEN RECEPTOR POSITIVE (HCC): Primary | ICD-10-CM

## 2020-09-25 DIAGNOSIS — Z17.0 MALIGNANT NEOPLASM OF UPPER-OUTER QUADRANT OF LEFT BREAST IN FEMALE, ESTROGEN RECEPTOR POSITIVE (HCC): Primary | ICD-10-CM

## 2020-09-25 PROCEDURE — 99214 OFFICE O/P EST MOD 30 MIN: CPT | Performed by: INTERNAL MEDICINE

## 2020-09-25 NOTE — PROGRESS NOTES
Hematology / Oncology Outpatient Follow Up Note    Keena Luis 50 y o  female :1975 CYU:[id number]         Date:  2020    Assessment / Plan:  A 78-year-old surgically postmenopausal woman with clinical stage IIIc locally advanced left breast cancer , grade 3, ER/[place] positive HER-2 negative disease  She has BRCA 1 mutation  She had very good partial pathological response with neoadjuvant chemotherapy  She is status post bilateral mastectomy as well as prophylactic bilateral oophorectomy  She is currently on adjuvant hormonal therapy with anastrozole with no significant side effects  She has no evidence recurrent disease, based on her symptomatology and physical examinations  I recommended her to continue with anastrozole 1 mg once a day  I am going to set up CT scan of chest abdomen pelvis in a year before her next visit  She is in agreement with my recommendations        Subjective:      HPI:             Interval History:  A 50year old surgically postmenopausal woman with locally advanced left breast cancer , grade 3, ER/WY positive HER-2 negative disease  She had a clinical stage III C  disease with internal mammary lymph node metastasis based on a PET CT scan when she was first diagnosed  She underwent neoadjuvant chemotherapy with AC followed by paclitaxel resulting in very good partial response  She underwent mastectomy and lymph node dissection which showed small residual disease  She was on tamoxifen until she has a bilateral oophorectomy in 2013  This was done because she has BRCA-1 mutation  There was no evidence of malignancy in the ovaries  Afterwards she switched her hormonal treatment to anastrozole  She came in today for routine followup  She continued to do well with no new complaint  Her hot flashes is very occasional and minimal   She has no complaint of musculoskeletal symptoms  Her weight is stable  She has no respiratory symptoms    Her performance status is normal         Objective:      Primary Diagnosis:     1  Locally-advanced left breast cancer, clinical stage IIIc disease, grade 3, ER/NH positive, HER2 negative disease, diagnosed in February of 2012  2  BRCA-1 mutation       Cancer Staging:  Cancer Staging  No matching staging information was found for the patient         Previous Hematologic/ Oncologic Treatment:      1  Neoadjuvant chemotherapy with dose-dense AC x4 followed by weekly paclitaxel, completed in late July 2012,   2  Mastectomy with lymph node dissection as well as right prophylactic mastectomy  3  Postmastectomy radiation therapy completed in November 2012  4  Adjuvant hormonal therapy with tamoxifen 20 mg once a day since October 2012 through early February 2013  5  Prophylactic bilateral oophorectomy and hysterectomy      Current Hematologic/ Oncologic Treatment:       Adjuvant hormonal therapy with anastrozole 1 mg once a day since February 2013       Disease Status:      1  Very good partial pathological response to the neoadjuvant chemotherapy  2  JENNIFER status post mastectomy and left axillary lymph node dissection, as well as prophylactic right mastectomy  3  Prophylactic bilateral oophorectomy in February 2013     Test Results:     Pathology:           Radiology:     CT scan of chest abdomen pelvis in March 2019 showed no evidence of metastatic disease  DEXA scan in April 2017 showed normal bone density  Breast MRI in 2020 was benign  BI-RADS 2       Laboratory:           Physical Exam:        General Appearance:    Alert, oriented          Eyes:    PERRL   Ears:    Normal external ear canals, both ears   Nose:   Nares normal, septum midline   Throat:   Mucosa moist  Pharynx without injection      Neck:   Supple         Lungs:     Clear to auscultation bilaterally   Chest Wall:    No tenderness or deformity    Heart:    Regular rate and rhythm         Abdomen:     Soft, non-tender, bowel sounds +, no organomegaly             Extremities:   Extremities no cyanosis or edema         Skin:   no rash or icterus  Lymph nodes:   Cervical, supraclavicular, and axillary nodes normal   Neurologic:   CNII-XII intact, normal strength, sensation and reflexes     Throughout             Breast exam:   status post bilateral mastectomy with reconstruction  No palpable abnormality in either reconstructed breast or chest wall             ROS: Review of Systems   All other systems reviewed and are negative  Imaging: Us Breast Left Limited (diagnostic)    Result Date: 9/18/2020  Narrative: There are stable cyst present at the 11 to 12 o'clock position left breast and 12 to 1 o'clock position left breast unchanged from previous examination  DIAGNOSIS: Left axillary pain TECHNIQUE: Ultrasound of the left breast(s) was performed  COMPARISONS: Prior breast imaging dated: 12/30/2019, 05/11/2018, and 08/24/2017 RELEVANT HISTORY: Family Breast Cancer History: History of breast cancer in Maternal Aunt  Family Medical History: Family medical history includes breast cancer in maternal aunt and ovarian cancer in mother  Personal History: No known relevant hormone history  Surgical history includes mastectomy, hysterectomy, and oophorectomy  Medical history includes breast cancer, BRCA 1 positive, and history of chemotherapy  RISK ASSESSMENT: 5 Year Tyrer-Cuzick: No Score 10 Year Tyrer-Cuzick: No Score Lifetime Tyrer-Cuzick: No Score INDICATION: Kaveh Richter is a 50 y o  female presenting for new left axillay pain, reassess palpable lumps along superior breast incision (previously imaged as cysts)  FINDINGS: Targeted sonographic evaluation of the area of pain left axilla demonstrates no suspicious mass or abnormal shadowing  Stable appearing left breast cysts at the 11-12 o'clock and 12 to 1 o'clock position left breast measuring 9 mm and 1 2 cm in maximum dimension respectively  There are no suspicious masses or areas of architectural distortion  Impression:  No sonographic abnormality at the area of palpable concern left axilla  Multiple left breast cysts are redemonstrated  ASSESSMENT/BI-RADS CATEGORY: Left: 2 - Benign Overall: 2 - Benign RECOMMENDATION:      - Clinical management for the left breast  Workstation ID: [id number]        Labs:   Lab Results   Component Value Date    WBC 4 4 03/12/2019    HGB 14 8 03/12/2019    HCT 41 3 03/12/2019    MCV 94 5 03/12/2019     03/12/2019     Lab Results   Component Value Date     08/12/2014    K 4 5 03/12/2019    CL 97 (L) 03/12/2019    CO2 31 03/12/2019    ANIONGAP 5 08/12/2014    BUN 14 03/12/2019    CREATININE 0 76 03/12/2019    GLUCOSE 103 07/14/2017    GLUF 86 04/09/2018    CALCIUM 10 2 03/12/2019    AST 25 03/12/2019    ALT 30 (H) 03/12/2019    ALKPHOS 56 03/12/2019    PROT 8 2 09/17/2013    BILITOT 0 6 09/17/2013    EGFR 97 04/09/2018           Current Medications: Reviewed  Allergies: Reviewed  PMH/FH/SH:  Reviewed      Vital Sign:    Body surface area is 1 83 meters squared      Wt Readings from Last 3 Encounters:   09/25/20 73 9 kg (163 lb)   09/18/20 74 4 kg (164 lb)   09/14/20 74 4 kg (164 lb)        Temp Readings from Last 3 Encounters:   09/25/20 98 °F (36 7 °C) (Tympanic Core)   09/14/20 (!) 96 7 °F (35 9 °C) (Temporal)   09/10/19 97 8 °F (36 6 °C) (Temporal)        BP Readings from Last 3 Encounters:   09/25/20 138/80   09/14/20 160/100   01/06/20 132/90         Pulse Readings from Last 3 Encounters:   09/25/20 78   09/14/20 96   09/10/19 83     @LASTSAO2(3)@

## 2020-10-15 ENCOUNTER — CONSULT (OUTPATIENT)
Dept: GYNECOLOGIC ONCOLOGY | Facility: CLINIC | Age: 45
End: 2020-10-15

## 2020-10-15 DIAGNOSIS — C50.412 MALIGNANT NEOPLASM OF UPPER-OUTER QUADRANT OF LEFT BREAST IN FEMALE, ESTROGEN RECEPTOR POSITIVE (HCC): ICD-10-CM

## 2020-10-15 DIAGNOSIS — Z15.09 BRCA1 GENE MUTATION POSITIVE: Primary | ICD-10-CM

## 2020-10-15 DIAGNOSIS — Z15.01 BRCA1 GENE MUTATION POSITIVE: Primary | ICD-10-CM

## 2020-10-15 DIAGNOSIS — Z17.0 MALIGNANT NEOPLASM OF UPPER-OUTER QUADRANT OF LEFT BREAST IN FEMALE, ESTROGEN RECEPTOR POSITIVE (HCC): ICD-10-CM

## 2020-10-15 PROCEDURE — NC001 PR NO CHARGE: Performed by: GENETIC COUNSELOR, MS

## 2021-01-13 ENCOUNTER — ANNUAL EXAM (OUTPATIENT)
Dept: OBGYN CLINIC | Facility: CLINIC | Age: 46
End: 2021-01-13
Payer: COMMERCIAL

## 2021-01-13 VITALS
HEIGHT: 66 IN | BODY MASS INDEX: 25.39 KG/M2 | WEIGHT: 158 LBS | SYSTOLIC BLOOD PRESSURE: 128 MMHG | DIASTOLIC BLOOD PRESSURE: 76 MMHG

## 2021-01-13 DIAGNOSIS — C50.412 MALIGNANT NEOPLASM OF UPPER-OUTER QUADRANT OF LEFT BREAST IN FEMALE, ESTROGEN RECEPTOR POSITIVE (HCC): ICD-10-CM

## 2021-01-13 DIAGNOSIS — Z17.0 MALIGNANT NEOPLASM OF UPPER-OUTER QUADRANT OF LEFT BREAST IN FEMALE, ESTROGEN RECEPTOR POSITIVE (HCC): ICD-10-CM

## 2021-01-13 DIAGNOSIS — Z15.01 BRCA1 GENE MUTATION POSITIVE: ICD-10-CM

## 2021-01-13 DIAGNOSIS — Z01.419 ENCOUNTER FOR ANNUAL ROUTINE GYNECOLOGICAL EXAMINATION: Primary | ICD-10-CM

## 2021-01-13 DIAGNOSIS — Z15.09 BRCA1 GENE MUTATION POSITIVE: ICD-10-CM

## 2021-01-13 PROCEDURE — 99396 PREV VISIT EST AGE 40-64: CPT | Performed by: OBSTETRICS & GYNECOLOGY

## 2021-01-13 NOTE — PROGRESS NOTES
Assessment/Plan:  Pap smear deferred due to low risk status  Encouraged self-breast examination as well as calcium supplementation  She will continue to follow-up with her surgeon, oncologist, plastic surgeon and family physician as scheduled  Discussed treatment options for symptomatic vaginal atrophy, including lubricants and vaginal moisturizers  All questions answered  She will return to office in 1 year or p r n  No problem-specific Assessment & Plan notes found for this encounter  Diagnoses and all orders for this visit:    Encounter for annual routine gynecological examination    Malignant neoplasm of upper-outer quadrant of left breast in female, estrogen receptor positive ([address] )    BRCA1 gene mutation positive          Subjective:      Patient ID: Stuart Quintanilla is a 50 y o  female  HPI     This is a very pleasant 78-year-old female  ( x2, age 10, 13) presents for her annual gyn exam   Patient was diagnosed with breast cancer in  where she underwent bilateral mastectomy, chemotherapy followed by radiation treatment  She has had multiple reconstructive breast surgeries and is currently on Arimidex since   Patient did test positive for brac a gene 1 and ultimately underwent LAVH BSO 2013  She does get occasional hot flashes which are tolerable  She denies any vaginal bleeding or spotting  She does complain of vaginal dryness  She has been in a monogamous relationship with her  for over 21 years  Pap smears have been normal     The following portions of the patient's history were reviewed and updated as appropriate: allergies, current medications, past family history, past medical history, past social history, past surgical history and problem list     Review of Systems   Constitutional: Negative for fatigue, fever and unexpected weight change  Respiratory: Negative for cough, chest tightness, shortness of breath and wheezing  Cardiovascular: Negative  Negative for chest pain and palpitations  Gastrointestinal: Negative  Negative for abdominal distention, abdominal pain, blood in stool, constipation, diarrhea, nausea and vomiting  Genitourinary: Negative  Negative for difficulty urinating, dyspareunia, dysuria, flank pain, frequency, genital sores, hematuria, pelvic pain, urgency, vaginal bleeding, vaginal discharge and vaginal pain  Skin: Negative for rash  Objective:      /76   Ht 5' 6" (1 676 m)   Wt 71 7 kg (158 lb)   BMI 25 50 kg/m²          Physical Exam  Constitutional:       Appearance: Normal appearance  She is well-developed  Cardiovascular:      Rate and Rhythm: Normal rate and regular rhythm  Pulmonary:      Effort: Pulmonary effort is normal       Breath sounds: Normal breath sounds  Comments: Breast exam reveals well-healed scars noted from prior surgery, bilateral implant  Chest:      Breasts:         Right: No inverted nipple, mass, nipple discharge, skin change or tenderness  Left: No mass, skin change or tenderness  Abdominal:      General: Bowel sounds are normal  There is no distension  Palpations: Abdomen is soft  Tenderness: There is no abdominal tenderness  There is no guarding or rebound  Genitourinary:     Labia:         Right: No rash, tenderness or lesion  Left: No rash, tenderness or lesion  Vagina: Normal  No signs of injury  No vaginal discharge, tenderness or lesions  Uterus: Absent  Adnexa:         Right: No mass, tenderness or fullness  Left: No mass, tenderness or fullness  Comments: External genitalia is within normal limits  The vagina is evident of slight estrogen deficiency  The cervix is surgically absent  The cuff is well-supported  Rectovaginal exam is confirmatory  Skin:     General: Skin is warm and dry  Neurological:      Mental Status: She is alert and oriented to person, place, and time

## 2021-01-22 ENCOUNTER — OFFICE VISIT (OUTPATIENT)
Dept: PLASTIC SURGERY | Facility: CLINIC | Age: 46
End: 2021-01-22
Payer: COMMERCIAL

## 2021-01-22 VITALS — TEMPERATURE: 96.8 F

## 2021-01-22 DIAGNOSIS — Z42.1 AFTERCARE POSTMASTECTOMY FOR BREAST RECONSTRUCTION: Primary | ICD-10-CM

## 2021-01-22 PROCEDURE — 99214 OFFICE O/P EST MOD 30 MIN: CPT | Performed by: SURGERY

## 2021-01-22 NOTE — PROGRESS NOTES
Assessment/Plan:  Please see HPI  She remains pleased with the results of the left breast reconstruction and right breast augmentation  We have discussed the role of autologous fat grafting to address the hollowing of the upper pole of the left breast, given COVID, etcetera, she has elected to hold off on any additional surgery  She will consider undergoing autologous fat grafting to the left breast in the spring or summer, otherwise she will be seen again in 1 year  There are no diagnoses linked to this encounter  Subjective:      Patient ID: Eran Avendaño is a 50 y o  female  HPI Francesca Stein presents today for a  routine follow-up visit, she is status post left breast reconstruction with a tram flap/implant, and right breast augmentation with a breast implant  She is pleased with her results  The following portions of the patient's history were reviewed and updated as appropriate: allergies, current medications, past family history, past medical history, past social history, past surgical history and problem list     Review of Systems   Constitutional: Negative for chills and fever  HENT: Negative for hearing loss  Eyes: Positive for visual disturbance  Negative for discharge  Respiratory: Negative for chest tightness and shortness of breath  Cardiovascular: Negative for chest pain and leg swelling  Gastrointestinal: Negative for constipation, diarrhea, nausea and rectal pain  Genitourinary: Negative for dysuria  Musculoskeletal: Negative for gait problem  Skin: Negative for rash  Allergic/Immunologic: Negative for immunocompromised state  Neurological: Negative for seizures and headaches  Hematological: Does not bruise/bleed easily  Psychiatric/Behavioral: Negative for dysphoric mood  The patient is not nervous/anxious  Objective:      Temp (!) 96 8 °F (36 °C) (Temporal)          Physical Exam  HENT:      Head: Normocephalic and atraumatic     Eyes: Extraocular Movements: Extraocular movements intact  Pupils: Pupils are equal, round, and reactive to light  Neck:      Musculoskeletal: Normal range of motion and neck supple  Cardiovascular:      Rate and Rhythm: Normal rate  Pulmonary:      Effort: Pulmonary effort is normal    Abdominal:      Palpations: Abdomen is soft  Musculoskeletal: Normal range of motion  Skin:     General: Skin is warm  Comments: Good breast asymmetry status post left breast reconstruction with tram flap, right breast augmentation, there is some hollowing of the upper pole of the left breast, as expected, tram flap donor site has healed nicely, both breasts are soft, supple, and without evidence of capsular contracture, see photos   Neurological:      General: No focal deficit present  Mental Status: She is alert     Psychiatric:         Mood and Affect: Mood normal

## 2021-01-22 NOTE — LETTER
January 22, 2021     [address] [place] [address]    Patient: Kendra Tillman   YOB: 1975   Date of Visit: 1/22/2021       Dear Dr Marilou Beavers: Thank you for referring Kendra Tillman to me for evaluation  Below are my notes for this consultation  If you have questions, please do not hesitate to call me  I look forward to following your patient along with you  Sincerely,        Teresa Myrick MD        CC: MD Teresa Zee MD  1/22/2021  9:02 AM  Incomplete  Assessment/Plan:  Please see HPI  She remains pleased with the results of the left breast reconstruction and right breast augmentation  We have discussed the role of autologous fat grafting to address the hollowing of the upper pole of the left breast, given COVID, etcetera, she has elected to hold off on any additional surgery  She will consider undergoing autologous fat grafting to the left breast in the spring or summer, otherwise she will be seen again in 1 year  There are no diagnoses linked to this encounter  Subjective:      Patient ID: Kendra Tillman is a 50 y o  female  HPI Laurent Alexander presents today for a  routine follow-up visit, she is status post left breast reconstruction with a tram flap/implant, and right breast augmentation with a breast implant  She is pleased with her results  The following portions of the patient's history were reviewed and updated as appropriate: allergies, current medications, past family history, past medical history, past social history, past surgical history and problem list     Review of Systems   Constitutional: Negative for chills and fever  HENT: Negative for hearing loss  Eyes: Positive for visual disturbance  Negative for discharge  Respiratory: Negative for chest tightness and shortness of breath  Cardiovascular: Negative for chest pain and leg swelling     Gastrointestinal: Negative for constipation, diarrhea, nausea and rectal pain  Genitourinary: Negative for dysuria  Musculoskeletal: Negative for gait problem  Skin: Negative for rash  Allergic/Immunologic: Negative for immunocompromised state  Neurological: Negative for seizures and headaches  Hematological: Does not bruise/bleed easily  Psychiatric/Behavioral: Negative for dysphoric mood  The patient is not nervous/anxious  Objective:      Temp (!) 96 8 °F (36 °C) (Temporal)          Physical Exam  HENT:      Head: Normocephalic and atraumatic  Eyes:      Extraocular Movements: Extraocular movements intact  Pupils: Pupils are equal, round, and reactive to light  Neck:      Musculoskeletal: Normal range of motion and neck supple  Cardiovascular:      Rate and Rhythm: Normal rate  Pulmonary:      Effort: Pulmonary effort is normal    Abdominal:      Palpations: Abdomen is soft  Musculoskeletal: Normal range of motion  Skin:     General: Skin is warm  Comments: Good breast asymmetry status post left breast reconstruction with tram flap, right breast augmentation, there is some hollowing of the upper pole of the left breast, as expected, tram flap donor site has healed nicely, both breasts are soft, supple, and without evidence of capsular contracture, see photos   Neurological:      General: No focal deficit present  Mental Status: She is alert     Psychiatric:         Mood and Affect: Mood normal

## 2021-03-10 DIAGNOSIS — Z23 ENCOUNTER FOR IMMUNIZATION: ICD-10-CM

## 2021-03-15 ENCOUNTER — IMMUNIZATIONS (OUTPATIENT)
Dept: FAMILY MEDICINE CLINIC | Facility: HOSPITAL | Age: 46
End: 2021-03-15

## 2021-03-15 DIAGNOSIS — Z23 ENCOUNTER FOR IMMUNIZATION: Primary | ICD-10-CM

## 2021-03-15 PROCEDURE — 91300 SARS-COV-2 / COVID-19 MRNA VACCINE (PFIZER-BIONTECH) 30 MCG: CPT

## 2021-03-15 PROCEDURE — 0001A SARS-COV-2 / COVID-19 MRNA VACCINE (PFIZER-BIONTECH) 30 MCG: CPT

## 2021-03-29 DIAGNOSIS — C50.919 MALIGNANT NEOPLASM OF BREAST IN FEMALE, ESTROGEN RECEPTOR POSITIVE, UNSPECIFIED LATERALITY, UNSPECIFIED SITE OF BREAST (HCC): ICD-10-CM

## 2021-03-29 DIAGNOSIS — Z17.0 MALIGNANT NEOPLASM OF BREAST IN FEMALE, ESTROGEN RECEPTOR POSITIVE, UNSPECIFIED LATERALITY, UNSPECIFIED SITE OF BREAST (HCC): ICD-10-CM

## 2021-03-29 RX ORDER — ANASTROZOLE 1 MG/1
TABLET ORAL
Qty: 90 TABLET | Refills: 0 | Status: SHIPPED | OUTPATIENT
Start: 2021-03-29 | End: 2021-07-06

## 2021-04-05 ENCOUNTER — IMMUNIZATIONS (OUTPATIENT)
Dept: FAMILY MEDICINE CLINIC | Facility: HOSPITAL | Age: 46
End: 2021-04-05

## 2021-04-05 DIAGNOSIS — Z23 ENCOUNTER FOR IMMUNIZATION: Primary | ICD-10-CM

## 2021-04-05 PROCEDURE — 0002A SARS-COV-2 / COVID-19 MRNA VACCINE (PFIZER-BIONTECH) 30 MCG: CPT

## 2021-04-05 PROCEDURE — 91300 SARS-COV-2 / COVID-19 MRNA VACCINE (PFIZER-BIONTECH) 30 MCG: CPT

## 2021-07-05 DIAGNOSIS — C50.919 MALIGNANT NEOPLASM OF BREAST IN FEMALE, ESTROGEN RECEPTOR POSITIVE, UNSPECIFIED LATERALITY, UNSPECIFIED SITE OF BREAST (HCC): ICD-10-CM

## 2021-07-05 DIAGNOSIS — Z17.0 MALIGNANT NEOPLASM OF BREAST IN FEMALE, ESTROGEN RECEPTOR POSITIVE, UNSPECIFIED LATERALITY, UNSPECIFIED SITE OF BREAST (HCC): ICD-10-CM

## 2021-07-06 RX ORDER — ANASTROZOLE 1 MG/1
TABLET ORAL
Qty: 90 TABLET | Refills: 1 | Status: SHIPPED | OUTPATIENT
Start: 2021-07-06 | End: 2021-11-29

## 2021-09-07 ENCOUNTER — HOSPITAL ENCOUNTER (OUTPATIENT)
Dept: RADIOLOGY | Age: 46
Discharge: HOME | End: 2021-09-07
Payer: COMMERCIAL

## 2021-09-07 DIAGNOSIS — C50.412 MALIGNANT NEOPLASM OF UPPER-OUTER QUADRANT OF LEFT BREAST IN FEMALE, ESTROGEN RECEPTOR POSITIVE (HCC): ICD-10-CM

## 2021-09-07 DIAGNOSIS — Z17.0 MALIGNANT NEOPLASM OF UPPER-OUTER QUADRANT OF LEFT BREAST IN FEMALE, ESTROGEN RECEPTOR POSITIVE (HCC): ICD-10-CM

## 2021-09-07 PROCEDURE — G1004 CDSM NDSC: HCPCS

## 2021-09-07 PROCEDURE — 71260 CT THORAX DX C+: CPT

## 2021-09-07 PROCEDURE — 74177 CT ABD & PELVIS W/CONTRAST: CPT

## 2021-09-07 RX ADMIN — IOHEXOL 100 ML: 350 INJECTION, SOLUTION INTRAVENOUS at 09:49

## 2021-09-17 ENCOUNTER — OFFICE VISIT (OUTPATIENT)
Dept: SURGICAL ONCOLOGY | Facility: CLINIC | Age: 46
End: 2021-09-17
Payer: COMMERCIAL

## 2021-09-17 VITALS
BODY MASS INDEX: 23.21 KG/M2 | RESPIRATION RATE: 18 BRPM | TEMPERATURE: 96.2 F | HEIGHT: 66 IN | HEART RATE: 88 BPM | OXYGEN SATURATION: 99 % | SYSTOLIC BLOOD PRESSURE: 132 MMHG | DIASTOLIC BLOOD PRESSURE: 78 MMHG | WEIGHT: 144.4 LBS

## 2021-09-17 DIAGNOSIS — C50.412 MALIGNANT NEOPLASM OF UPPER-OUTER QUADRANT OF LEFT BREAST IN FEMALE, ESTROGEN RECEPTOR POSITIVE (HCC): Primary | ICD-10-CM

## 2021-09-17 DIAGNOSIS — Z17.0 MALIGNANT NEOPLASM OF UPPER-OUTER QUADRANT OF LEFT BREAST IN FEMALE, ESTROGEN RECEPTOR POSITIVE (HCC): Primary | ICD-10-CM

## 2021-09-17 DIAGNOSIS — Z15.09 BRCA1 GENE MUTATION POSITIVE: ICD-10-CM

## 2021-09-17 DIAGNOSIS — Z15.01 BRCA1 GENE MUTATION POSITIVE: ICD-10-CM

## 2021-09-17 DIAGNOSIS — Z79.811 USE OF ANASTROZOLE (ARIMIDEX): ICD-10-CM

## 2021-09-17 PROCEDURE — 99213 OFFICE O/P EST LOW 20 MIN: CPT | Performed by: NURSE PRACTITIONER

## 2021-09-17 RX ORDER — ROSUVASTATIN CALCIUM 5 MG/1
TABLET, COATED ORAL DAILY
COMMUNITY
Start: 2021-08-27

## 2021-09-17 NOTE — PROGRESS NOTES
Surgical Oncology Follow Up       [facility] [id number]    Robbin Slot  1975  [id number]  [facility] [place]  [place] [id number]    Chief Complaint   Patient presents with    Follow-up       Assessment/Plan:  1  Malignant neoplasm of upper-outer quadrant of left breast in female, estrogen receptor positive ([facility] )  - 1 year f/u visit    2  Use of anastrozole (Arimidex)  - Continue use per medical oncology    3  BRCA1 gene mutation positive      Discussion/Summary: Patient is a 57-year-old female who presents today for follow-up visit for left breast cancer diagnosed in March of 2012  Her pathology revealed invasive ductal carcinoma, ER 60-70%, RI 35-40%, HER-2 negative  She completed neoadjuvant chemotherapy  She was found to carry a BRCA1 genetic mutation  Therefore, she underwent a bilateral mastectomy, SNB in August 2012 by Dr Gómez Rubio  She had 1/12 positive lymph nodes  This was high risk on Mammaprint  She had reconstruction performed by Dr Nerissa Hodges completed postmastectomy radiation therapy and is currently taking anastrozole  She had a prophylactic bilateral oophorectomy  She offers no new complaints today and there are no concerns on today's exam   I will plan to see her back in 1 year or sooner should the need arise  She was instructed to call with any new concerns or symptoms prior to that time  All of her questions were answered today      History of Present Illness:     Oncology History   Malignant neoplasm of upper-outer quadrant of left breast in female, estrogen receptor positive ([address] )   2/1/2012 Initial Diagnosis    Malignant neoplasm of upper-outer quadrant of left breast in female, estrogen receptor positive ([address] )    Left breast biopsy  Invasive ductal carcinoma  ER 60-70%  RI 35-40%  HER-2 negative        Genomic Testing Mammaprint High Risk     2/9/2012 Genetic Testing    POSITIVE BRCA 1 mutation      - 7/2012 Chemotherapy    Neoadjuvant chemotherapy with dose-dense AC x4 followed by weekly paclitaxel (Dr Margaret Conway)     8/28/2012 Surgery    Bilateral mastectomies, Bilateral sentinel lymph node biopsy, Left axillary dissection, Removal port-a-cath (Dr Jignesh Elizondo)      - 11/2012 Radiation    Postmastectomy radiation therapy (Dr Chadd Montemayor)     10/2012 -  Hormone Therapy    Adjuvant hormonal therapy with tamoxifen 20 mg once a day since October 2012 through early February 2013  Anastrozole February 2013- present     2/1/2013 Surgery    Prophylactic bilateral oophorectomy and hysterectomy  (Dr Trey Sampson)          -Interval History:  Patient notices no changes on her self breast exam   She denies persistent headaches, back pain or bone pain, cough or shortness of breath, abdominal pain  She had a CT scan of her chest abdomen and pelvis which revealed no evidence of recurrence or metastatic disease  Review of Systems:  Review of Systems   Constitutional: Negative for activity change, appetite change, chills, fatigue, fever and unexpected weight change  Respiratory: Negative for cough and shortness of breath  Cardiovascular: Negative for chest pain  Gastrointestinal: Negative for abdominal pain, constipation, diarrhea, nausea and vomiting  Musculoskeletal: Negative for arthralgias, back pain, gait problem and myalgias  Skin: Negative for color change and rash  Neurological: Negative for dizziness and headaches  Hematological: Negative for adenopathy  Psychiatric/Behavioral: Negative for agitation and confusion  All other systems reviewed and are negative        Patient Active Problem List   Diagnosis    Malignant neoplasm of upper-outer quadrant of left breast in female, estrogen receptor positive ([facility] )    Abnormal glucose    Anxiety state    Female hypogonadism due to aromatase inhibitor therapy    Leukocytopenia    Migraine without aura    Preventative health care    [facility] (female) [facility]    BRCA1 gene mutation positive    Use of anastrozole (Arimidex)    Abscess involving suture    Personal history of malignant neoplasm of breast     Past Medical History:   Diagnosis Date    BRCA1 positive     Breast cancer ([facility] ) 03/2018    History of chemotherapy      Past Surgical History:   Procedure Laterality Date    HYSTERECTOMY      MASTECTOMY Bilateral     OOPHORECTOMY Bilateral     RECONSTRUCTION BREAST W/ TRAM FLAP       Family History   Problem Relation Age of Onset    Ovarian cancer Mother     Breast cancer Maternal Aunt      Social History     Socioeconomic History    Marital status: /Civil Union     Spouse name: Not on file    Number of children: Not on file    Years of education: Not on file    Highest education level: Not on file   Occupational History    Not on file   Tobacco Use    Smoking status: Former Smoker    Smokeless tobacco: Never Used   Vaping Use    Vaping Use: Never used   Substance and Sexual Activity    Alcohol use: Yes    Drug use: No    Sexual activity: Yes     Partners: Male     Birth control/protection: Post-menopausal   Other Topics Concern    Not on file   Social History Narrative    Not on file     Social Determinants of Health     Financial Resource Strain:     Difficulty of Paying Living Expenses:    Food Insecurity:     Worried About Running Out of Food in the Last Year:     [address] in the Last Year:    Transportation Needs:     Lack of Transportation (Medical):      Lack of Transportation (Non-Medical):    Physical Activity:     Days of Exercise per Week:     Minutes of Exercise per Session:    Stress:     Feeling of Stress :    Social Connections:     Frequency of Communication with Friends and Family:     Frequency of Social Gatherings with Friends and Family:     Attends Alevism Services:     Active Member of Clubs or Organizations:     Attends Club or Organization Meetings:     Marital Status:    Intimate Partner Violence:     Fear of Current or Ex-Partner:     Emotionally Abused:     Physically Abused:     Sexually Abused:        Current Outpatient Medications:     anastrozole (ARIMIDEX) 1 mg tablet, Take 1 tablet by mouth once daily, Disp: 90 tablet, Rfl: 1    CALCIUM PO, Take 1,000 Units by mouth daily, Disp: , Rfl:     cholecalciferol (VITAMIN D3) 1,000 units tablet, Take 1,000 Units by mouth daily, Disp: , Rfl:     Multiple Vitamins-Minerals (MULTI COMPLETE PO), Take 1 tablet by mouth daily, Disp: , Rfl:     rosuvastatin (CRESTOR) 5 mg tablet, Take by mouth Daily, Disp: , Rfl:     venlafaxine (EFFEXOR XR) 75 mg 24 hr capsule, Take 75 mg by mouth daily, Disp: , Rfl:     Diclofenac Sodium (Voltaren) 1 %, Voltaren 1 % topical gel  APPLY 2 GRAM TO THE AFFECTED AREA(S) BY TOPICAL ROUTE 4 TIMES PER DAY (Patient not taking: Reported on 9/17/2021), Disp: , Rfl:   Allergies   Allergen Reactions    Sulfamethoxazole Hives    Trimethoprim Hives    Bactrim [Sulfamethoxazole-Trimethoprim] Rash     Other reaction(s): rash     Vitals:    09/17/21 0943   BP: 132/78   Pulse: 88   Resp: 18   Temp: (!) 96 2 °F (35 7 °C)   SpO2: 99%       Physical Exam  Vitals reviewed  Constitutional:       General: She is not in acute distress  Appearance: Normal appearance  She is well-developed  She is not diaphoretic  HENT:      Head: Normocephalic and atraumatic  Cardiovascular:      Rate and Rhythm: Normal rate and regular rhythm  Heart sounds: Normal heart sounds  Pulmonary:      Effort: Pulmonary effort is normal       Breath sounds: Normal breath sounds  Chest:      Comments: Bilateral reconstructed breasts with implants present  There is stable nodularity in the upper mid and outer aspect of the left reconstructed breast consistent with previously imaged at cysts  Abdominal:      Palpations: Abdomen is soft   There is no mass  Tenderness: There is no abdominal tenderness  Musculoskeletal:         General: Normal range of motion  Cervical back: Normal range of motion  Lymphadenopathy:      Upper Body:      Right upper body: No supraclavicular or axillary adenopathy  Left upper body: No supraclavicular or axillary adenopathy  Skin:     General: Skin is warm and dry  Findings: No rash  Neurological:      Mental Status: She is alert and oriented to person, place, and time  Psychiatric:         Speech: Speech normal            Advance Care Planning/Advance Directives:  Discussed disease status, cancer treatment plans and/or cancer treatment goals with the patient

## 2021-09-28 ENCOUNTER — TELEPHONE (OUTPATIENT)
Dept: PLASTIC SURGERY | Facility: CLINIC | Age: 46
End: 2021-09-28

## 2021-10-01 ENCOUNTER — OFFICE VISIT (OUTPATIENT)
Dept: HEMATOLOGY ONCOLOGY | Facility: CLINIC | Age: 46
End: 2021-10-01
Payer: COMMERCIAL

## 2021-10-01 VITALS
BODY MASS INDEX: 23.83 KG/M2 | RESPIRATION RATE: 18 BRPM | WEIGHT: 148.3 LBS | HEART RATE: 91 BPM | DIASTOLIC BLOOD PRESSURE: 82 MMHG | OXYGEN SATURATION: 98 % | HEIGHT: 66 IN | SYSTOLIC BLOOD PRESSURE: 134 MMHG | TEMPERATURE: 99.7 F

## 2021-10-01 DIAGNOSIS — Z17.0 MALIGNANT NEOPLASM OF UPPER-OUTER QUADRANT OF LEFT BREAST IN FEMALE, ESTROGEN RECEPTOR POSITIVE (HCC): Primary | ICD-10-CM

## 2021-10-01 DIAGNOSIS — C50.412 MALIGNANT NEOPLASM OF UPPER-OUTER QUADRANT OF LEFT BREAST IN FEMALE, ESTROGEN RECEPTOR POSITIVE (HCC): Primary | ICD-10-CM

## 2021-10-01 DIAGNOSIS — Z15.09 BRCA1 GENE MUTATION POSITIVE: ICD-10-CM

## 2021-10-01 DIAGNOSIS — Z15.01 BRCA1 GENE MUTATION POSITIVE: ICD-10-CM

## 2021-10-01 PROCEDURE — 99214 OFFICE O/P EST MOD 30 MIN: CPT | Performed by: INTERNAL MEDICINE

## 2021-11-27 DIAGNOSIS — Z17.0 MALIGNANT NEOPLASM OF BREAST IN FEMALE, ESTROGEN RECEPTOR POSITIVE, UNSPECIFIED LATERALITY, UNSPECIFIED SITE OF BREAST (HCC): ICD-10-CM

## 2021-11-27 DIAGNOSIS — C50.919 MALIGNANT NEOPLASM OF BREAST IN FEMALE, ESTROGEN RECEPTOR POSITIVE, UNSPECIFIED LATERALITY, UNSPECIFIED SITE OF BREAST (HCC): ICD-10-CM

## 2021-11-29 RX ORDER — ANASTROZOLE 1 MG/1
TABLET ORAL
Qty: 90 TABLET | Refills: 0 | Status: SHIPPED | OUTPATIENT
Start: 2021-11-29 | End: 2022-06-09

## 2022-01-31 ENCOUNTER — ANNUAL EXAM (OUTPATIENT)
Dept: OBGYN CLINIC | Facility: CLINIC | Age: 47
End: 2022-01-31
Payer: COMMERCIAL

## 2022-01-31 VITALS
WEIGHT: 152 LBS | BODY MASS INDEX: 24.43 KG/M2 | DIASTOLIC BLOOD PRESSURE: 82 MMHG | HEIGHT: 66 IN | SYSTOLIC BLOOD PRESSURE: 124 MMHG

## 2022-01-31 DIAGNOSIS — Z90.13 HISTORY OF BILATERAL MASTECTOMY: ICD-10-CM

## 2022-01-31 DIAGNOSIS — Z85.3 HISTORY OF BREAST CANCER: ICD-10-CM

## 2022-01-31 DIAGNOSIS — Z01.419 ENCOUNTER FOR ANNUAL ROUTINE GYNECOLOGICAL EXAMINATION: Primary | ICD-10-CM

## 2022-01-31 PROCEDURE — S0612 ANNUAL GYNECOLOGICAL EXAMINA: HCPCS | Performed by: OBSTETRICS & GYNECOLOGY

## 2022-01-31 NOTE — PROGRESS NOTES
Assessment/Plan:  Pap smear deferred due to low risk status  Encouraged self-breast examination as well as calcium supplementation  She will continue to follow-up with her breast surgeon and oncologist as scheduled  Discussed over-the-counter agents for symptomatic vaginal atrophy  All questions answered  No problem-specific Assessment & Plan notes found for this encounter  Diagnoses and all orders for this visit:    Encounter for annual routine gynecological examination    History of breast cancer    History of bilateral mastectomy          Subjective:      Patient ID: Everlene Heimlich is a 48 y o  female  HPI     This is a very pleasant 39-year-old female P2 ( x2, age 14, 13) presents for gyn exam   Patient was diagnosed with breast cancer in , status post bilateral mastectomy, chemotherapy followed by radiation treatment  She has had multiple reconstructive breast surgeries and is currently on Arimidex since   She did test positive for BRCA-1 and ultimately underwent LAVH BSO 2013  She denies any hot flashes or night sweats  There has been no changes in bowel or bladder function  She has been in a monogamous relationship for over 22 years  Pap smears had been normal     She has been on Effexor for hot flashes with significant improvement  The following portions of the patient's history were reviewed and updated as appropriate: allergies, current medications, past family history, past medical history, past social history, past surgical history and problem list     Review of Systems   Constitutional: Negative for fatigue, fever and unexpected weight change  Respiratory: Negative for cough, chest tightness, shortness of breath and wheezing  Cardiovascular: Negative  Negative for chest pain and palpitations  Gastrointestinal: Negative  Negative for abdominal distention, abdominal pain, blood in stool, constipation, diarrhea, nausea and vomiting  Genitourinary: Negative  Negative for difficulty urinating, dyspareunia, dysuria, flank pain, frequency, genital sores, hematuria, pelvic pain, urgency, vaginal bleeding, vaginal discharge and vaginal pain  Skin: Negative for rash  Objective:      /82   Ht 5' 6" (1 676 m)   Wt 68 9 kg (152 lb)   BMI 24 53 kg/m²          Physical Exam  Constitutional:       Appearance: Normal appearance  She is well-developed  Cardiovascular:      Rate and Rhythm: Normal rate and regular rhythm  Pulmonary:      Effort: Pulmonary effort is normal       Breath sounds: Normal breath sounds  Chest:   Breasts:      Right: Absent  Left: Absent  Comments: Bilateral implants noted, well-healed surgical scars  Abdominal:      General: Bowel sounds are normal  There is no distension  Palpations: Abdomen is soft  Tenderness: There is no abdominal tenderness  There is no guarding or rebound  Genitourinary:     Labia:         Right: No rash, tenderness or lesion  Left: No rash, tenderness or lesion  Vagina: Normal  No signs of injury  No vaginal discharge or tenderness  Cervix: No cervical motion tenderness, discharge, friability, lesion, erythema or cervical bleeding  Uterus: Not enlarged and not tender  Adnexa:         Right: No mass, tenderness or fullness  Left: No mass, tenderness or fullness  Comments: External genitalia is within normal limits  The vagina is evident of estrogen deficiency  The cervix is surgically absent  The cuff is well-supported  Neurological:      Mental Status: She is alert and oriented to person, place, and time     Psychiatric:         Behavior: Behavior normal

## 2022-06-08 DIAGNOSIS — C50.919 MALIGNANT NEOPLASM OF BREAST IN FEMALE, ESTROGEN RECEPTOR POSITIVE, UNSPECIFIED LATERALITY, UNSPECIFIED SITE OF BREAST (HCC): ICD-10-CM

## 2022-06-08 DIAGNOSIS — Z17.0 MALIGNANT NEOPLASM OF BREAST IN FEMALE, ESTROGEN RECEPTOR POSITIVE, UNSPECIFIED LATERALITY, UNSPECIFIED SITE OF BREAST (HCC): ICD-10-CM

## 2022-06-09 RX ORDER — ANASTROZOLE 1 MG/1
TABLET ORAL
Qty: 90 TABLET | Refills: 0 | Status: SHIPPED | OUTPATIENT
Start: 2022-06-09

## 2022-08-10 ENCOUNTER — TELEPHONE (OUTPATIENT)
Dept: SURGICAL ONCOLOGY | Facility: CLINIC | Age: 47
End: 2022-08-10

## 2022-09-07 DIAGNOSIS — Z17.0 MALIGNANT NEOPLASM OF BREAST IN FEMALE, ESTROGEN RECEPTOR POSITIVE, UNSPECIFIED LATERALITY, UNSPECIFIED SITE OF BREAST (HCC): ICD-10-CM

## 2022-09-07 DIAGNOSIS — C50.919 MALIGNANT NEOPLASM OF BREAST IN FEMALE, ESTROGEN RECEPTOR POSITIVE, UNSPECIFIED LATERALITY, UNSPECIFIED SITE OF BREAST (HCC): ICD-10-CM

## 2022-09-08 RX ORDER — ANASTROZOLE 1 MG/1
TABLET ORAL
Qty: 90 TABLET | Refills: 1 | Status: SHIPPED | OUTPATIENT
Start: 2022-09-08

## 2022-09-22 ENCOUNTER — ONCOLOGY SURVIVORSHIP (OUTPATIENT)
Dept: SURGICAL ONCOLOGY | Facility: CLINIC | Age: 47
End: 2022-09-22
Payer: COMMERCIAL

## 2022-09-22 VITALS
WEIGHT: 147 LBS | DIASTOLIC BLOOD PRESSURE: 70 MMHG | HEIGHT: 66 IN | OXYGEN SATURATION: 98 % | RESPIRATION RATE: 17 BRPM | TEMPERATURE: 97.1 F | SYSTOLIC BLOOD PRESSURE: 130 MMHG | HEART RATE: 84 BPM | BODY MASS INDEX: 23.63 KG/M2

## 2022-09-22 DIAGNOSIS — N63.21 MASS OF UPPER OUTER QUADRANT OF LEFT BREAST: Primary | ICD-10-CM

## 2022-09-22 DIAGNOSIS — Z15.09 BRCA1 GENE MUTATION POSITIVE: ICD-10-CM

## 2022-09-22 DIAGNOSIS — Z17.0 MALIGNANT NEOPLASM OF UPPER-OUTER QUADRANT OF LEFT BREAST IN FEMALE, ESTROGEN RECEPTOR POSITIVE (HCC): ICD-10-CM

## 2022-09-22 DIAGNOSIS — Z15.01 BRCA1 GENE MUTATION POSITIVE: ICD-10-CM

## 2022-09-22 DIAGNOSIS — C50.412 MALIGNANT NEOPLASM OF UPPER-OUTER QUADRANT OF LEFT BREAST IN FEMALE, ESTROGEN RECEPTOR POSITIVE (HCC): ICD-10-CM

## 2022-09-22 DIAGNOSIS — Z79.811 USE OF ANASTROZOLE (ARIMIDEX): ICD-10-CM

## 2022-09-22 PROCEDURE — 99215 OFFICE O/P EST HI 40 MIN: CPT | Performed by: NURSE PRACTITIONER

## 2022-09-22 NOTE — PROGRESS NOTES
Assessment/Plan:    Diagnoses and all orders for this visit:    Mass of upper outer quadrant of left breast  -     US breast left limited (diagnostic); Future    Malignant neoplasm of upper-outer quadrant of left breast in female, estrogen receptor positive [facility])    Patient is a 40-year-old female that was diagnosed with a left breast cancer in March of 2012  She was found to carry a BRCA 1 genetic mutation  Her pathology revealed invasive ductal carcinoma, ER 60-70%, VT 35-40%, HER-2 negative  She completed neoadjuvant chemotherapy  And underwent a left modified radical mastectomy and a right prophylactic mastectomy with Dr Judie Leong  She had 1/12 positive lymph nodes  This was high risk of Mammaprint  She completed postmastectomy radiation therapy and is currently maintained on anastrozole  She offers no new complaints today  Breast cancer survivorship visit performed today and treatment summary and care plan were reviewed with patient  On today's clinical exam, she seems to have some nodularity at the 2 o'clock position of the left reconstructed breast   This may represent ongoing fat necrosis given her multiple surgeries and fat grafting  I will assess with ultrasound  Otherwise there are no other clinical concerns  She will follow up with GI for repeat testing after a recent colonoscopy revealed an adenoma with high-grade dysplasia  She will discuss the need for a repeat DEXA scan with her medical oncologist   She continues to exercise regularly and is not currently interested in a referral to the strength ABC program at this time  Assuming her imaging is benign I will plan to see her back in 1 year for a follow-up survivorship visit or sooner should the need arise  -     Ambulatory Referral to Oncology Social Worker;  Future    BRCA1 gene mutation positive    Use of anastrozole (Arimidex)        REASON FOR VISIT:   Survivorship    Problem:  Cancer Staging  Malignant neoplasm of upper-outer quadrant of left breast in female, estrogen receptor positive ([facility] )  Staging form: Breast, AJCC 7th Edition  - Pathologic stage from 8/28/2012: Stage IIA (yT1b, N1a, cM0) - Signed by Liyah Shaffer on 5/7/2018        Previous therapy:  Oncology History   Malignant neoplasm of upper-outer quadrant of left breast in female, estrogen receptor positive ([facility] [address] )   2/1/2012 Biopsy    Left breast biopsy  Invasive ductal carcinoma  ER 60-70%  MT 35-40%  HER-2 negative     2/9/2012 Genetic Testing    POSITIVE BRCA 1 mutation     2/10/2012 Genomic Testing    Mammaprint High Risk      - 7/2012 Chemotherapy    Neoadjuvant chemotherapy with dose-dense Adriamycin, Cyclophosphamide x4 followed by weekly paclitaxel (Dr Chavez)     8/28/2012 Surgery    Bilateral mastectomies, Bilateral sentinel lymph node biopsy, Left axillary dissection, Removal port-a-cath (Dr Valentino Mcfarlane)    Breast reconstruction (Dr Miky Ramirez)      - 12/7/2012 Radiation    Postmastectomy radiation therapy (Dr Anthony Trinh)     9/22/2012 Surgery    Removal left breast implant     10/2012 -  Hormone Therapy    Adjuvant hormonal therapy with tamoxifen 20 mg once a day since October 2012 through early February 2013  Anastrozole February 2013- present     2/1/2013 Surgery    Prophylactic bilateral oophorectomy and hysterectomy  (Dr Merry Crook)      Surgery       10/24/2013 Surgery    Left TRAM flap reconstruction     1/30/2014 Surgery    Revision of TRAM flap reconstruction     5/1/2014 Surgery    Right breast expander/implant exchange, Left breast implant     8/28/2014 Surgery    Revision left breast mound with fat transfer, left nipple reconstruction, revision abdominal scar           Patient ID: Prema Moncada is a 50 y o  female  Presenting today for breast cancer survivorship visit  She notices no changes on her self-breast exam   She continues on anastrozole  She denies persistent headaches, back pain or bone pain, cough or shortness of breath, abdominal pain  She underwent a colonoscopy on September 1st which revealed multiple polyps  One polyp was an adenoma with high-grade dysplasia  She is scheduled for a sigmoidoscopy in October as a follow-up for this  She is told to repeat her colonoscopy in 1 year  She has not had a recent DEXA scan  Review of Systems   Constitutional: Negative for activity change, appetite change, chills, fatigue, fever and unexpected weight change  Respiratory: Negative for cough and shortness of breath  Cardiovascular: Negative for chest pain  Gastrointestinal: Positive for blood in stool  Negative for abdominal pain, constipation, diarrhea, nausea and vomiting  Musculoskeletal: Negative for arthralgias, back pain, gait problem and myalgias  Skin: Negative for color change and rash  Neurological: Negative for dizziness and headaches  Hematological: Negative for adenopathy  Psychiatric/Behavioral: Negative for agitation and confusion  All other systems reviewed and are negative  Objective:    Blood pressure 130/70, pulse 84, temperature (!) 97 1 °F (36 2 °C), resp  rate 17, height 5' 6" (1 676 m), weight 66 7 kg (147 lb), SpO2 98 %, not currently breastfeeding  Body mass index is 23 73 kg/m²  Body surface area is 1 76 meters squared  Physical Exam  Vitals reviewed  Constitutional:       General: She is not in acute distress  Appearance: Normal appearance  She is well-developed  She is not diaphoretic  HENT:      Head: Normocephalic and atraumatic  Cardiovascular:      Rate and Rhythm: Normal rate and regular rhythm  Heart sounds: Normal heart sounds  Pulmonary:      Effort: Pulmonary effort is normal       Breath sounds: Normal breath sounds  Chest:   Breasts:      Right: No axillary adenopathy or supraclavicular adenopathy  Left: No axillary adenopathy or supraclavicular adenopathy  Comments: Bilateral reconstructed breasts with implants present   There is stable nodularity along the superior incision left reconstructed breast- previously imaged as cysts  There is nodularity at the 2:00 position, 10-11 cm FN- will assess with u/s  Abdominal:      Palpations: Abdomen is soft  There is no mass  Tenderness: There is no abdominal tenderness  Musculoskeletal:         General: Normal range of motion  Cervical back: Normal range of motion  Lymphadenopathy:      Upper Body:      Right upper body: No supraclavicular or axillary adenopathy  Left upper body: No supraclavicular or axillary adenopathy  Skin:     General: Skin is warm and dry  Findings: No rash  Neurological:      Mental Status: She is alert and oriented to person, place, and time  Psychiatric:         Speech: Speech normal          Discussed symptoms related to disease recurrence, Yes    Evaluated for late effects related to cancer treatment, Yes, describe: discussed effects of chemo, surgery, RT    Screening current for cervical cancer, Yes, describe: n/a hysterectomy    Screening current for colon cancer, Yes, describe: had colonoscopy 9/1, multiple polyps, 1 with high-grade dysplasia, scheduled for sigmoidoscopy next month, repeat colonoscopy in 1 year    Cancer rehabilitation services addressed, Yes, describe: not interested in [facility], continues bootcamps regularly    Screening current for osteoporosis, No patient to discuss with medical oncologist at upcoming appointment    Oncology Treatment Summary reviewed with patient and copy provided, Yes    Referral placed for psychosocial evaluation/screening to oncology social work  Yes    I have spent 60 minutes with Patient  today in which greater than 50% of this time was spent in counseling/coordination of care regarding breast cancer survivorship

## 2022-09-23 ENCOUNTER — PATIENT OUTREACH (OUTPATIENT)
Dept: CASE MANAGEMENT | Facility: OTHER | Age: 47
End: 2022-09-23

## 2022-09-23 NOTE — PROGRESS NOTES
OSW received survivorship SW referral  OSW will place outreach TC call  Problem: VTE, Risk for  Goal: # No s/s of VTE  Outcome: Outcome Met, Continue evaluating goal progress toward completion  Patient receives Lovenox injections.     Problem: Pain  Goal: Acceptable pain/comfort level is achieved/maintained at rest (based on Pain Behaviors Scale)  This goal is used for patients who are not able to self-report pain and are assessed for pain using the Pain Behaviors Scale  Outcome: Outcome Met, Continue evaluating goal progress toward completion   10/22/18 1924   Pain Associated Behaviors   Pain Behaviors Evaluation No behaviors present - continue plan of care       Problem: Activity Intolerance  Goal: # Functional status is maintained or returned to baseline   10/14/18 2200 10/22/18 0322 10/22/18 0457   Activity and Safety   Activity --  --  --    Mobility   Level of Assistance --  --  Moderate assist   ADL Screening: Document daily if not Independent   Bathing --  1 --    Dressing --  1 --    Toileting --  1 --    Transferring --  1 --    Continence --  1 --    Feeding --  0 --    ADL Score --  5 --    Comfort and Hygiene   Hygiene --  --  --    Nutrition Intake   Percent Meals Eaten (%) 0 % --  --    OTHER   Elimination Risk Interventions --  Offer toileting with every interaction (patient able to identify need) --    Oral Care   Oral Care --  --  Mouth swabbed    10/22/18 0621   Activity and Safety   Activity Resting in bed   Mobility   Level of Assistance --    ADL Screening: Document daily if not Independent   Bathing --    Dressing --    Toileting --    Transferring --    Continence --    Feeding --    ADL Score --    Comfort and Hygiene   Hygiene Complete;Perineal care   Nutrition Intake   Percent Meals Eaten (%) --    OTHER   Elimination Risk Interventions --    Oral Care   Oral Care --        Problem: Swallowing Difficulty  Goal: Enteral Nutrition (2.1)  Outcome: Outcome Met, Continue evaluating goal progress toward completion  Tolerating well

## 2022-10-05 ENCOUNTER — PATIENT OUTREACH (OUTPATIENT)
Dept: CASE MANAGEMENT | Facility: OTHER | Age: 47
End: 2022-10-05

## 2022-10-05 NOTE — PROGRESS NOTES
OSW placed outreach TC to complete DT and Assessment for survivorship  OSW received pts VM  OSW left a detailed message requesting a return call  Contact information was provided

## 2022-10-06 ENCOUNTER — HOSPITAL ENCOUNTER (OUTPATIENT)
Dept: ULTRASOUND IMAGING | Facility: CLINIC | Age: 47
Discharge: HOME | End: 2022-10-06
Payer: COMMERCIAL

## 2022-10-06 DIAGNOSIS — N63.21 MASS OF UPPER OUTER QUADRANT OF LEFT BREAST: ICD-10-CM

## 2022-10-06 PROCEDURE — 76642 ULTRASOUND BREAST LIMITED: CPT

## 2022-10-07 ENCOUNTER — OFFICE VISIT (OUTPATIENT)
Dept: HEMATOLOGY ONCOLOGY | Facility: CLINIC | Age: 47
End: 2022-10-07
Payer: COMMERCIAL

## 2022-10-07 VITALS
OXYGEN SATURATION: 97 % | SYSTOLIC BLOOD PRESSURE: 120 MMHG | BODY MASS INDEX: 23.14 KG/M2 | DIASTOLIC BLOOD PRESSURE: 70 MMHG | WEIGHT: 144 LBS | HEART RATE: 67 BPM | HEIGHT: 66 IN | RESPIRATION RATE: 18 BRPM | TEMPERATURE: 97.6 F

## 2022-10-07 DIAGNOSIS — C50.412 MALIGNANT NEOPLASM OF UPPER-OUTER QUADRANT OF LEFT BREAST IN FEMALE, ESTROGEN RECEPTOR POSITIVE (HCC): Primary | ICD-10-CM

## 2022-10-07 DIAGNOSIS — Z17.0 MALIGNANT NEOPLASM OF UPPER-OUTER QUADRANT OF LEFT BREAST IN FEMALE, ESTROGEN RECEPTOR POSITIVE (HCC): Primary | ICD-10-CM

## 2022-10-07 PROCEDURE — 99214 OFFICE O/P EST MOD 30 MIN: CPT | Performed by: INTERNAL MEDICINE

## 2022-10-07 NOTE — PROGRESS NOTES
Hematology / Oncology Outpatient Follow Up Note    Kaveh Richter 50 y o  female :1975 PYD:[id number]         Date:  10/7/2022    Assessment / Plan:  A 53-year-old surgically postmenopausal woman with clinical stage IIIc locally advanced left breast cancer , grade 3, 60% positive, OK 35% positive, HER-2 negative disease  She has BRCA 1 mutation  She had very good partial pathological response with neoadjuvant chemotherapy  She is status post bilateral mastectomy as well as prophylactic bilateral oophorectomy  She is currently on adjuvant hormonal therapy with anastrozole with no significant side effects  She has no evidence recurrent disease, based on her symptoms and physical examination  I recommended her to continue anastrozole 1 mg once a day until 2023 to complete 10 years of adjuvant hormonal therapy  I will see her again in a year with CT scan of chest abdomen pelvis  She is in agreement with my recommendations        Subjective:      HPI:             Interval History:  A 48 year old surgically postmenopausal woman with locally advanced left breast cancer , grade 3, ER  She is 60% positive, OK 35% positive, HER-2 negative disease  She had a clinical stage III C  disease with internal mammary lymph node metastasis based on a PET CT scan when she was first diagnosed  She underwent neoadjuvant chemotherapy with AC followed by paclitaxel resulting in very good partial response  She underwent mastectomy and lymph node dissection which showed small residual disease  She was on tamoxifen until she has a bilateral oophorectomy in 2013  This was done because she has BRCA-1 mutation  There was no evidence of malignancy in the ovaries  Afterwards, she switched her hormonal treatment to anastrozole, with excellent tolerance  She came in today for routine followup  She feels well with no new complaint  She denied any pain  She has no respiratory symptoms  Her weight is stable    Her performance status is normal         Objective:      Primary Diagnosis:     1  Locally-advanced left breast cancer, clinical stage IIIc disease, grade 3, ER/TX positive, HER2 negative disease, diagnosed in February of 2012  2  BRCA-1 mutation       Cancer Staging:  Cancer Staging  No matching staging information was found for the patient         Previous Hematologic/ Oncologic Treatment:      1  Neoadjuvant chemotherapy with dose-dense AC x4 followed by weekly paclitaxel, completed in late July 2012,   2  Mastectomy with lymph node dissection as well as right prophylactic mastectomy  3  Postmastectomy radiation therapy completed in November 2012  4  Adjuvant hormonal therapy with tamoxifen 20 mg once a day since October 2012 through early February 2013  5  Prophylactic bilateral oophorectomy and hysterectomy      Current Hematologic/ Oncologic Treatment:       Adjuvant hormonal therapy with anastrozole 1 mg once a day since February 2013       Disease Status:      1  Very good partial pathological response to the neoadjuvant chemotherapy  2  JENNIFER status post mastectomy and left axillary lymph node dissection, as well as prophylactic right mastectomy  3  Prophylactic bilateral oophorectomy in February 2013     Test Results:     Pathology:           Radiology:     CT scan of chest abdomen pelvis in September 2021 showed no evidence of metastatic disease  DEXA scan in April 2017 showed normal bone density  Ultrasound of left breast in October 2022 was benign  Bi rad 2      Laboratory:           Physical Exam:        General Appearance:    Alert, oriented          Eyes:    PERRL   Ears:    Normal external ear canals, both ears   Nose:   Nares normal, septum midline   Throat:   Mucosa moist  Pharynx without injection      Neck:   Supple         Lungs:     Clear to auscultation bilaterally   Chest Wall:    No tenderness or deformity    Heart:    Regular rate and rhythm         Abdomen:     Soft, non-tender, bowel sounds +, no organomegaly               Extremities:   Extremities no cyanosis or edema         Skin:   no rash or icterus  Lymph nodes:   Cervical, supraclavicular, and axillary nodes normal   Neurologic:   CNII-XII intact, normal strength, sensation and reflexes     Throughout             Breast exam:   status post bilateral mastectomy with reconstruction  No palpable abnormality in either reconstructed breast or chest wall                   ROS: Review of Systems   All other systems reviewed and are negative  Imaging: US breast left limited (diagnostic)    Result Date: 10/6/2022  Narrative: DIAGNOSIS: Mass of upper outer quadrant of left breast TECHNIQUE: Ultrasound of the left breast(s) was performed  COMPARISONS: Prior breast imaging dated: 09/18/2020 and 05/11/2018 RELEVANT HISTORY: Family Breast Cancer History: History of breast cancer in Maternal Aunt  Family Medical History: Family medical history includes breast cancer in maternal aunt and ovarian cancer in mother  Personal History: Hormone history includes tamoxifen  Surgical history includes mastectomy, hysterectomy, and oophorectomy  Medical history includes breast cancer, BRCA 1 positive, and history of chemotherapy  RISK ASSESSMENT: Tyrer-zick risk assessment reporting was suppressed due to the patient's history and/or demographic factors  INDICATION: Kiran Aparicio is a 50 y o  female presenting for nodularity 2:00 11 cm from the nipple, left reconstructed breast  FINDINGS: There are no suspicious masses or areas of architectural distortion  Previously evaluated oil cysts are noted at the 12 -1 o'clock position, 8 cm from the nipple  These correlate with the palpable region  No significant change  Impression:  No evidence of malignancy   ASSESSMENT/BI-RADS CATEGORY: Left: 2 - Benign Overall: 2 - Benign RECOMMENDATION:      - Clinical management for the left breast  Workstation ID: [id number]        Labs:   Lab Results Component Value Date    WBC 4 4 03/12/2019    HGB 14 8 03/12/2019    HCT 41 3 03/12/2019    MCV 94 5 03/12/2019     03/12/2019     Lab Results   Component Value Date     08/12/2014    K 4 5 03/12/2019    CL 97 (L) 03/12/2019    CO2 31 03/12/2019    ANIONGAP 5 08/12/2014    BUN 14 03/12/2019    CREATININE 0 76 03/12/2019    GLUCOSE 103 07/14/2017    GLUF 86 04/09/2018    CALCIUM 10 2 03/12/2019    AST 25 03/12/2019    ALT 30 (H) 03/12/2019    ALKPHOS 56 03/12/2019    PROT 8 2 09/17/2013    BILITOT 0 6 09/17/2013    EGFR 97 04/09/2018       Current Medications: Reviewed  Allergies: Reviewed  PMH/FH/SH:  Reviewed      Vital Sign:    Body surface area is 1 74 meters squared      Wt Readings from Last 3 Encounters:   10/07/22 65 3 kg (144 lb)   09/22/22 66 7 kg (147 lb)   01/31/22 68 9 kg (152 lb)        Temp Readings from Last 3 Encounters:   10/07/22 97 6 °F (36 4 °C) (Temporal)   09/22/22 (!) 97 1 °F (36 2 °C)   10/01/21 99 7 °F (37 6 °C) (Tympanic Core)        BP Readings from Last 3 Encounters:   10/07/22 120/70   09/22/22 130/70   01/31/22 124/82         Pulse Readings from Last 3 Encounters:   10/07/22 67   09/22/22 84   10/01/21 91     @LASTSAO2(3)@

## 2022-10-10 ENCOUNTER — PATIENT OUTREACH (OUTPATIENT)
Dept: CASE MANAGEMENT | Facility: OTHER | Age: 47
End: 2022-10-10

## 2022-10-10 NOTE — PROGRESS NOTES
Biopsychosocial and Barriers Assessment    Cancer Diagnosis: Breast survivorship  Home/Cell Phone: [phone]  Emergency Contact: Yayo Can- [phone]  Marital Status:   Interpretation concerns, speaks another language, preferred language: English  Cultural concerns: none  Ability to read or write: yes    Caregiver/Support: n/a  Children:not addressed  Child/Elder care:n/a    Housin story home  Home Setup: many steps  Lives With: spouse  Daily Living Activities:independent  Durable Medical Equipment:none  Ambulation: independent    Preferred Pharmacy: [facility]  [employer] co-pays with insurance: [employer] co-pays with medication coverage: none  No medication coverage: n/a    Primary Care Provider: Dr Darek Nuñez  Hx of  [address]: not addressed  Hx of Short term rehab: none  Mental Health Hx: none  Substance Abuse Hx: none  Employment: [employer]   Status/Location: n/a  Ability to pay bills:yes   POA/LW/AD: not addrtessed  Transportation Plan/Concerns: none      What do you know about your Cancer Diagnosis    What has your doctor told you about your cancer diagnosis: I had breast cancer 10 years ago  What has your doctor told you about your cancer treatment: Continue anastrozole 1 mg once a day until 2023     What specific concerns do you have about your diagnosis and treatment: None    Have you been made aware of any hair loss associated with treatment: N/A    Additional Comments:     OSW received return TC from pt this morning  OSW introduced self and role  Pt completed a DT, where she scored a 0/10  She did not have ant concerns at this time  She states that she is doing well  OSW encouraged pt to call in the future if any needs arise  OSW will close the SW referral at this time

## 2023-01-25 ENCOUNTER — ANNUAL EXAM (OUTPATIENT)
Dept: OBGYN CLINIC | Facility: CLINIC | Age: 48
End: 2023-01-25

## 2023-01-25 VITALS
SYSTOLIC BLOOD PRESSURE: 126 MMHG | DIASTOLIC BLOOD PRESSURE: 82 MMHG | WEIGHT: 156 LBS | HEIGHT: 66 IN | BODY MASS INDEX: 25.07 KG/M2

## 2023-01-25 DIAGNOSIS — Z90.13 HISTORY OF BILATERAL MASTECTOMY: ICD-10-CM

## 2023-01-25 DIAGNOSIS — Z01.419 ENCOUNTER FOR ANNUAL ROUTINE GYNECOLOGICAL EXAMINATION: Primary | ICD-10-CM

## 2023-01-25 DIAGNOSIS — Z85.3 HISTORY OF BREAST CANCER: ICD-10-CM

## 2023-01-25 NOTE — PROGRESS NOTES
Assessment/Plan:  Pap smear deferred due to low risk status  Encourage self breast examination  She will continue to follow-up with her breast surgeon as scheduled  Reviewed colon cancer screening, up-to-date  Discussed treatment options for symptomatic vaginal atrophy  She will continue to use over-the-counter agents  All questions answered  She will continue to follow-up with primary care as scheduled  Return to office in 1 year or as needed  No problem-specific Assessment & Plan notes found for this encounter  Diagnoses and all orders for this visit:    Encounter for annual routine gynecological examination    History of breast cancer    History of bilateral mastectomy          Subjective:      Patient ID: Gita Cedeno is a 50 y o  female  HPI     This is a very pleasant 61-year-old female P2 ( x2, age 23, 13) presents for her GYN exam   She was diagnosed with breast cancer in , status post bilateral mastectomy, chemotherapy followed by radiation  She underwent multiple reconstructive breast surgeries and started Arimidex   She will complete her 10-year course of Arimidex 3/2023  She continues to follow-up with her breast surgeon once a year  She did test positive for BRCA-1, and ultimately underwent LAVHBSO 2013  She denies any hot flashes or night sweats  No changes in bowel or bladder function  She has been in a monogamous relationship with her  for over 23 years  Pap smears have been normal     She was having some rectal bleeding noted in her stool and underwent her first colonoscopy 2022 revealing polyps x3  She has repeat colonoscopy 2023        D/c arimidex 3/2013     dr Amarilis Pina q yr    The following portions of the patient's history were reviewed and updated as appropriate: allergies, current medications, past family history, past medical history, past social history, past surgical history and problem list     Review of Systems   Constitutional: Negative for fatigue, fever and unexpected weight change  Respiratory: Negative for cough, chest tightness, shortness of breath and wheezing  Cardiovascular: Negative  Negative for chest pain and palpitations  Gastrointestinal: Negative  Negative for abdominal distention, abdominal pain, blood in stool, constipation, diarrhea, nausea and vomiting  Genitourinary: Negative  Negative for difficulty urinating, dyspareunia, dysuria, flank pain, frequency, genital sores, hematuria, pelvic pain, urgency, vaginal bleeding, vaginal discharge and vaginal pain  Skin: Negative for rash  Objective:      /82   Ht 5' 6" (1 676 m)   Wt 70 8 kg (156 lb)   BMI 25 18 kg/m²          Physical Exam  Constitutional:       Appearance: Normal appearance  She is well-developed  Cardiovascular:      Rate and Rhythm: Normal rate and regular rhythm  Pulmonary:      Effort: Pulmonary effort is normal       Breath sounds: Normal breath sounds  Chest:   Breasts:     Right: No inverted nipple, mass, nipple discharge, skin change or tenderness  Left: No inverted nipple, mass, nipple discharge, skin change or tenderness  Abdominal:      General: Bowel sounds are normal  There is no distension  Palpations: Abdomen is soft  Tenderness: There is no abdominal tenderness  There is no guarding or rebound  Genitourinary:     Labia:         Right: No rash, tenderness or lesion  Left: No rash, tenderness or lesion  Vagina: Normal  No signs of injury  No vaginal discharge or tenderness  Uterus: Absent  Adnexa:         Right: No mass, tenderness or fullness  Left: No mass, tenderness or fullness  Neurological:      Mental Status: She is alert and oriented to person, place, and time  Psychiatric:         Behavior: Behavior normal        External genitalia is within normal limits  The vagina is evident of estrogen deficiency  The cervix is surgically absent    The cuff is well supported

## 2023-09-12 ENCOUNTER — TELEPHONE (OUTPATIENT)
Dept: HEMATOLOGY ONCOLOGY | Facility: CLINIC | Age: 48
End: 2023-09-12

## 2023-09-12 NOTE — TELEPHONE ENCOUNTER
I called [facility] regarding an appointment that they have scheduled with Dr. Kwame Lozano scheduled on 10/09/2023      I left a voicemail explaining to patient that this appointment will need to be rescheduled due to a change in the providers schedule. Patient was advised to call [facility] to reschedule. A [employer] message (if applicable) has been sent to patient relaying the above information and advising patient to call [facility] and reschedule their appointment.

## 2023-09-13 ENCOUNTER — TELEPHONE (OUTPATIENT)
Dept: HEMATOLOGY ONCOLOGY | Facility: CLINIC | Age: 48
End: 2023-09-13

## 2023-09-13 NOTE — TELEPHONE ENCOUNTER
HUYEN  DR santa JIMENES   Who are you speaking with? Patient   If it is not the patient, are they listed on an active communication consent form? N/A   Is this a HUYEN or DR santa JIMENES HUYEN   Which provider is patient currently scheduled or established with? Dr. Kwame Lozano   What is the original appointment date and time? 10/9/23 @ 9am   At which location is the appointment scheduled to take place? [place]   Which provider is the patient transitioning care to? Dr. Nathaniel Goodman   What is the new appointment date and time? 10/9/23 @ 11am   At which location is the new appointment scheduled to take place? [place]   What is the reason for this change?  Kwame Lozano retiring

## 2023-09-25 ENCOUNTER — HOSPITAL ENCOUNTER (OUTPATIENT)
Dept: RADIOLOGY | Age: 48
Discharge: HOME | End: 2023-09-25
Payer: COMMERCIAL

## 2023-09-25 DIAGNOSIS — C50.412 MALIGNANT NEOPLASM OF UPPER-OUTER QUADRANT OF LEFT BREAST IN FEMALE, ESTROGEN RECEPTOR POSITIVE: ICD-10-CM

## 2023-09-25 DIAGNOSIS — Z17.0 MALIGNANT NEOPLASM OF UPPER-OUTER QUADRANT OF LEFT BREAST IN FEMALE, ESTROGEN RECEPTOR POSITIVE: ICD-10-CM

## 2023-09-25 PROCEDURE — 74177 CT ABD & PELVIS W/CONTRAST: CPT

## 2023-09-25 PROCEDURE — 71260 CT THORAX DX C+: CPT

## 2023-09-25 RX ADMIN — IOHEXOL 100 ML: 350 INJECTION, SOLUTION INTRAVENOUS at 09:27

## 2023-09-28 ENCOUNTER — ONCOLOGY SURVIVORSHIP (OUTPATIENT)
Dept: SURGICAL ONCOLOGY | Facility: CLINIC | Age: 48
End: 2023-09-28
Payer: COMMERCIAL

## 2023-09-28 ENCOUNTER — PATIENT OUTREACH (OUTPATIENT)
Dept: CASE MANAGEMENT | Facility: HOSPITAL | Age: 48
End: 2023-09-28

## 2023-09-28 VITALS
HEART RATE: 68 BPM | WEIGHT: 155.6 LBS | HEIGHT: 66 IN | OXYGEN SATURATION: 98 % | SYSTOLIC BLOOD PRESSURE: 120 MMHG | DIASTOLIC BLOOD PRESSURE: 90 MMHG | BODY MASS INDEX: 25.01 KG/M2 | TEMPERATURE: 97.2 F

## 2023-09-28 DIAGNOSIS — Z08 ENCOUNTER FOR FOLLOW-UP SURVEILLANCE OF BREAST CANCER: ICD-10-CM

## 2023-09-28 DIAGNOSIS — Z85.3 PERSONAL HISTORY OF MALIGNANT NEOPLASM OF BREAST: ICD-10-CM

## 2023-09-28 DIAGNOSIS — Z15.01 BRCA1 GENE MUTATION POSITIVE: Primary | ICD-10-CM

## 2023-09-28 DIAGNOSIS — Z15.09 BRCA1 GENE MUTATION POSITIVE: Primary | ICD-10-CM

## 2023-09-28 DIAGNOSIS — Z85.3 ENCOUNTER FOR FOLLOW-UP SURVEILLANCE OF BREAST CANCER: ICD-10-CM

## 2023-09-28 PROBLEM — Z79.811 USE OF ANASTROZOLE (ARIMIDEX): Status: RESOLVED | Noted: 2019-03-06 | Resolved: 2023-09-28

## 2023-09-28 PROCEDURE — 99213 OFFICE O/P EST LOW 20 MIN: CPT | Performed by: NURSE PRACTITIONER

## 2023-09-28 RX ORDER — VENLAFAXINE HYDROCHLORIDE 37.5 MG/1
37.5 CAPSULE, EXTENDED RELEASE ORAL DAILY
COMMUNITY
Start: 2023-08-14

## 2023-09-28 NOTE — PROGRESS NOTES
Assessment/Plan:    Diagnoses and all orders for this visit:    BRCA1 gene mutation positive    Personal history of malignant neoplasm of breast  -     Ambulatory referral to oncology social worker; Future    Encounter for follow-up surveillance of breast cancer    Patient is a 68-year-old female that was diagnosed with a left breast cancer in March of 2012. She was found to carry a BRCA 1 genetic mutation. Her pathology revealed invasive ductal carcinoma, ER 60-70%, AK 35-40%, HER-2 negative. She completed neoadjuvant chemotherapy. She underwent a left modified radical mastectomy and a right prophylactic mastectomy with Dr. Charlene Velasquez. She had 1/12 positive lymph nodes. This was high risk of Mammaprint. She completed postmastectomy radiation therapy and completed five years of Anastrozole therapy. She offers no new complaints today. Breast cancer follow up survivorship visit performed today. No worrisome findings on today's exam and patient offers no new complaints today. She has an upcoming follow-up with GI to schedule a repeat colonoscopy. With regards to the BRCA1 mutation, she has undergone a total hysterectomy. She has no family history of pancreatic cancer. I will plan to see her back in 1 year for a follow-up survivorship visit or sooner should the need arise. She was instructed to contact me with any changes or concerns in the interim. All of her questions were answered today.         REASON FOR VISIT:   Survivorship      Previous therapy:  Oncology History   Malignant neoplasm of upper-outer quadrant of left breast in female, estrogen receptor positive ([address])   2/1/2012 Biopsy    Left breast biopsy  Invasive ductal carcinoma  ER 60-70%  AK 35-40%  HER-2 negative     2/9/2012 Genetic Testing    POSITIVE BRCA 1 mutation     2/10/2012 Genomic Testing    Mammaprint High Risk      - 7/2012 Chemotherapy    Neoadjuvant chemotherapy with dose-dense Adriamycin, Cyclophosphamide x4 followed by weekly paclitaxel (Dr. Megan Huffman)     8/28/2012 Surgery    Bilateral mastectomies, Bilateral sentinel lymph node biopsy, Left axillary dissection, Removal port-a-cath (Dr. Kaycee Sloan)    Breast reconstruction (Dr Wendy Escudero)      - 12/7/2012 Radiation    Postmastectomy radiation therapy (Dr. Bernadine aLrsen)     9/22/2012 Surgery    Removal left breast implant     10/2012 -  Hormone Therapy    Adjuvant hormonal therapy with tamoxifen 20 mg once a day since October 2012 through early February 2013. Anastrozole February 2013- present     2/1/2013 Surgery    Prophylactic bilateral oophorectomy and hysterectomy  (Dr. Geeta Cherry)      Surgery       10/24/2013 Surgery    Left TRAM flap reconstruction     1/30/2014 Surgery    Revision of TRAM flap reconstruction     5/1/2014 Surgery    Right breast expander/implant exchange, Left breast implant     8/28/2014 Surgery    Revision left breast mound with fat transfer, left nipple reconstruction, revision abdominal scar           Patient ID: Suzi Levy is a 46 y.o. female  Presenting today for a breast cancer survivorship visit. She has not appreciated any changes on self-exam.  She completed 10 years of anastrozole therapy and is now on observation. She denies persistent headaches, back pain or bone pain, cough or shortness of breath, abdominal pain. Review of Systems   Constitutional: Negative for activity change, appetite change, chills, fatigue, fever and unexpected weight change. Respiratory: Negative for cough and shortness of breath. Cardiovascular: Negative for chest pain. Gastrointestinal: Negative for abdominal pain, constipation, diarrhea, nausea and vomiting. Musculoskeletal: Negative for arthralgias, back pain, gait problem and myalgias. Skin: Negative for color change and rash. Neurological: Negative for dizziness and headaches. Hematological: Negative for adenopathy. Psychiatric/Behavioral: Negative for agitation and confusion.    All other systems reviewed and are negative. Objective:    Pulse 68, temperature (!) 97.2 °F (36.2 °C), temperature source Temporal, height 5' 6" (1.676 m), weight 70.6 kg (155 lb 9.6 oz), SpO2 98 %, not currently breastfeeding. Body mass index is 25.11 kg/m². Physical Exam  Vitals reviewed. Constitutional:       General: She is not in acute distress. Appearance: Normal appearance. She is well-developed. She is not diaphoretic. HENT:      Head: Normocephalic and atraumatic. Cardiovascular:      Rate and Rhythm: Normal rate and regular rhythm. Heart sounds: Normal heart sounds. Pulmonary:      Effort: Pulmonary effort is normal.      Breath sounds: Normal breath sounds. Chest:      Comments: Bilateral reconstructed breasts with implants present. There is stable nodularity along the superior aspect of the left implant- fat necrosis/cysts. No other dominant masses, skin changes or nodules. Abdominal:      Palpations: Abdomen is soft. There is no mass. Tenderness: There is no abdominal tenderness. Musculoskeletal:         General: Normal range of motion. Cervical back: Normal range of motion. Lymphadenopathy:      Upper Body:      Right upper body: No supraclavicular or axillary adenopathy. Left upper body: No supraclavicular or axillary adenopathy. Skin:     General: Skin is warm and dry. Findings: No rash. Neurological:      Mental Status: She is alert and oriented to person, place, and time.    Psychiatric:         Speech: Speech normal.         Discussed symptoms related to disease recurrence, Yes    Evaluated for late effects related to cancer treatment, Yes    Screening current for cervical cancer, Yes, describe: n/a hysterectomy    Screening current for colon cancer, Yes, describe: upcoming appt with GI to schedule 1 year f/u     Cancer rehabilitation services addressed, No    Screening current for osteoporosis, No    Referral placed for psychosocial evaluation/screening to oncology social work  Yes

## 2023-09-28 NOTE — PROGRESS NOTES
OSW received referral for survivorship. Patient saw JENNY Falcon today, 9/28/23. OSW will outreach for assessment and DT.

## 2023-10-05 ENCOUNTER — PATIENT OUTREACH (OUTPATIENT)
Dept: CASE MANAGEMENT | Facility: HOSPITAL | Age: 48
End: 2023-10-05

## 2023-10-05 ENCOUNTER — RA CDI HCC (OUTPATIENT)
Dept: OTHER | Facility: HOSPITAL | Age: 48
End: 2023-10-05

## 2023-10-05 NOTE — PROGRESS NOTES
[facility] coding opportunities       Chart reviewed, no opportunity found: CHART REVIEWED, NO OPPORTUNITY FOUND        Patients Insurance        Commercial Insurance: Jose Raul Boswell

## 2023-10-05 NOTE — PROGRESS NOTES
Biopsychosocial and Barriers Assessment Survivorship     Home/Cell Phone:[phone]  Emergency Contact: Rusty spouse  Marital Status:    Interpretation concerns, speaks another language, preferred language: English   Cultural concerns: none  Ability to read or write:  Yes     Housing: house  Home Setup:  No difficulty navigating her home  Lives With: spouse, 2 daughters however 1 dtr currently away at college and a dog  Daily Living Activities: independent    [address] [facility]: none  Ambulation: independent    Preferred Pharmacy: [facility]   Medication coverage: has coverage    Employment: makes her own schedule   [place] Status/Location: no  Ability to pay bills: yes  POA/LW/AD: does not have but was agreeable to [place] mailing her [facility]'s ACP documentation    Additional Comments:  OSW outreached to patient, assessment and DT completed. Patient self-rated 1/10 on DT. Patient denies any physical, social, spiritual, emotional or practical concerns at this time. Patient denies current needs at this time.

## 2023-10-09 ENCOUNTER — OFFICE VISIT (OUTPATIENT)
Dept: HEMATOLOGY ONCOLOGY | Facility: CLINIC | Age: 48
End: 2023-10-09
Payer: COMMERCIAL

## 2023-10-09 VITALS
OXYGEN SATURATION: 98 % | HEART RATE: 72 BPM | WEIGHT: 155 LBS | DIASTOLIC BLOOD PRESSURE: 80 MMHG | TEMPERATURE: 96.9 F | SYSTOLIC BLOOD PRESSURE: 118 MMHG | HEIGHT: 66 IN | BODY MASS INDEX: 24.91 KG/M2 | RESPIRATION RATE: 18 BRPM

## 2023-10-09 DIAGNOSIS — Z15.09 BRCA1 GENE MUTATION POSITIVE: ICD-10-CM

## 2023-10-09 DIAGNOSIS — Z15.01 BRCA1 GENE MUTATION POSITIVE: ICD-10-CM

## 2023-10-09 DIAGNOSIS — Z17.0 MALIGNANT NEOPLASM OF UPPER-OUTER QUADRANT OF LEFT BREAST IN FEMALE, ESTROGEN RECEPTOR POSITIVE: Primary | ICD-10-CM

## 2023-10-09 DIAGNOSIS — C50.412 MALIGNANT NEOPLASM OF UPPER-OUTER QUADRANT OF LEFT BREAST IN FEMALE, ESTROGEN RECEPTOR POSITIVE: Primary | ICD-10-CM

## 2023-10-09 PROCEDURE — 99214 OFFICE O/P EST MOD 30 MIN: CPT | Performed by: INTERNAL MEDICINE

## 2023-10-09 NOTE — PROGRESS NOTES
Hematology/Oncology Outpatient Follow-up  Rafael Singleton 46 y.o. female [id number]    Date:  10/9/2023        Assessment and Plan:  1. Malignant neoplasm of upper-outer quadrant of left breast in female, estrogen receptor positive   Stage IIIc locally advanced left breast cancer , grade 3, 60% positive, MT 35% positive, HER-2 negative disease. Status post neoadjuvant  chemotherapy with AC followed by T with very good response. She then subsequently had bilateral mastectomy and tamoxifen as endocrine therapy until she had bilateral oophorectomy. She was then switch to anastrozole in February 2013 and completed 10 years in February of this year. We will see her on as-needed basis in the future. 2. BRCA1 gene mutation positive  She had a CT scan of the chest abdomen pelvis which was negative on 9/25/2023. She is status post bilateral mastectomy and bilateral oophorectomy. There is no consensus regarding screening for other malignancies like melanoma and pancreatic cancer unless the family history is pointing in that direction. I did discuss with her getting dermatological examination on a yearly basis. She was asked to get in touch with us for any concerning symptoms. HPI:   51 year old surgically postmenopausal woman with locally advanced left breast cancer , grade 3, ER  She is 60% positive, MT 35% positive, HER-2 negative disease. She had a clinical stage III C. disease with internal mammary lymph node metastasis based on a PET CT scan when she was first diagnosed. She underwent neoadjuvant chemotherapy with AC followed by paclitaxel resulting in very good partial response. She underwent mastectomy and lymph node dissection which showed small residual disease. She was on tamoxifen until she has a bilateral oophorectomy in February 2013. This was done because she has BRCA-1 mutation. There was no evidence of malignancy in the ovaries.  Afterwards, she switched her hormonal treatment to anastrozole, with excellent tolerance. Interval history:  The patient came today for follow-up visit to establish oncological care since her primary oncologist left the practice recently. The patient stated that she completed 10 years worth of endocrine therapy with anastrozole around February of this year. She had a CT scan of the chest abdomen pelvis on 9/25/2023 which was read as negative for any metastatic disease or significant abnormality. Oncology History   Malignant neoplasm of upper-outer quadrant of left breast in female, estrogen receptor positive    2/1/2012 Biopsy    Left breast biopsy  Invasive ductal carcinoma  ER 60-70%  AL 35-40%  HER-2 negative     2/9/2012 Genetic Testing    POSITIVE BRCA 1 mutation     2/10/2012 Genomic Testing    Mammaprint High Risk      - 7/2012 Chemotherapy    Neoadjuvant chemotherapy with dose-dense Adriamycin, Cyclophosphamide x4 followed by weekly paclitaxel (Dr. Megan Huffman)     8/28/2012 Surgery    Bilateral mastectomies, Bilateral sentinel lymph node biopsy, Left axillary dissection, Removal port-a-cath (Dr. Kaycee Sloan)    Breast reconstruction (Dr Wendy Escudero)      - 12/7/2012 Radiation    Postmastectomy radiation therapy (Dr. Bernadine Larsen)     9/22/2012 Surgery    Removal left breast implant     10/2012 -  Hormone Therapy    Adjuvant hormonal therapy with tamoxifen 20 mg once a day since October 2012 through early February 2013. Anastrozole February 2013- present     2/1/2013 Surgery    Prophylactic bilateral oophorectomy and hysterectomy  (Dr. Geeta Cherry)      Surgery       10/24/2013 Surgery    Left TRAM flap reconstruction     1/30/2014 Surgery    Revision of TRAM flap reconstruction     5/1/2014 Surgery    Right breast expander/implant exchange, Left breast implant     8/28/2014 Surgery    Revision left breast mound with fat transfer, left nipple reconstruction, revision abdominal scar             ROS: Review of Systems   Constitutional: Negative for chills and fever. HENT: Negative for ear pain and sore throat. Eyes: Negative for pain and visual disturbance. Respiratory: Negative for cough and shortness of breath. Cardiovascular: Negative for chest pain and palpitations. Gastrointestinal: Negative for abdominal pain and vomiting. Genitourinary: Negative for dysuria and hematuria. Musculoskeletal: Negative for arthralgias and back pain. Skin: Negative for color change and rash. Neurological: Negative for seizures and syncope. All other systems reviewed and are negative. Past Medical History:   Diagnosis Date   • BRCA1 positive    • Breast cancer ([facility]) 03/2018   • History of chemotherapy        Past Surgical History:   Procedure Laterality Date   • HYSTERECTOMY     • MASTECTOMY Bilateral    • OOPHORECTOMY Bilateral    • RECONSTRUCTION BREAST W/ TRAM FLAP         Social History     Socioeconomic History   • Marital status: /Civil Union     Spouse name: None   • Number of children: None   • Years of education: None   • Highest education level: None   Occupational History   • None   Tobacco Use   • Smoking status: Former   • Smokeless tobacco: Never   Vaping Use   • Vaping Use: Never used   Substance and Sexual Activity   • Alcohol use:  Yes   • Drug use: No   • Sexual activity: Yes     Partners: Male     Birth control/protection: Surgical   Other Topics Concern   • None   Social History Narrative   • None     Social Determinants of Health     Financial Resource Strain: Not on file   Food Insecurity: Not on file   Transportation Needs: Not on file   Physical Activity: Not on file   Stress: Not on file   Social Connections: Not on file   Intimate Partner Violence: Not on file   Housing Stability: Not on file       Family History   Problem Relation Age of Onset   • Ovarian cancer Mother    • Breast cancer Maternal Aunt        Allergies   Allergen Reactions   • Sulfamethoxazole Hives   • Trimethoprim Hives   • Bactrim [Sulfamethoxazole-Trimethoprim] Rash Other reaction(s): rash         Current Outpatient Medications:   •  CALCIUM PO, Take 1,000 Units by mouth daily, Disp: , Rfl:   •  cholecalciferol (VITAMIN D3) 1,000 units tablet, Take 1,000 Units by mouth daily, Disp: , Rfl:   •  Multiple Vitamins-Minerals (MULTI COMPLETE PO), Take 1 tablet by mouth daily, Disp: , Rfl:   •  rosuvastatin (CRESTOR) 5 mg tablet, Take by mouth Daily, Disp: , Rfl:   •  venlafaxine (EFFEXOR-XR) 37.5 mg 24 hr capsule, Take 37.5 mg by mouth daily Take with food, Disp: , Rfl:   •  anastrozole (ARIMIDEX) 1 mg tablet, Take 1 tablet by mouth once daily, Disp: 90 tablet, Rfl: 1      Physical Exam:  /80 (BP Location: Right arm)   Pulse 72   Temp (!) 96.9 °F (36.1 °C) (Tympanic)   Resp 18   Ht 5' 6" (1.676 m)   Wt 70.3 kg (155 lb)   SpO2 98%   BMI 25.02 kg/m²     Physical Exam  Constitutional:       General: She is not in acute distress. Appearance: She is well-developed. She is not diaphoretic. HENT:      Head: Normocephalic and atraumatic. Nose: Nose normal.   Eyes:      General: No scleral icterus. Right eye: No discharge. Left eye: No discharge. Conjunctiva/sclera: Conjunctivae normal.      Pupils: Pupils are equal, round, and reactive to light. Neck:      Thyroid: No thyromegaly. Vascular: No JVD. Trachea: No tracheal deviation. Cardiovascular:      Rate and Rhythm: Normal rate and regular rhythm. Heart sounds: Normal heart sounds. No murmur heard. No friction rub. Pulmonary:      Effort: Pulmonary effort is normal. No respiratory distress. Breath sounds: Normal breath sounds. No stridor. No wheezing or rales. Chest:      Chest wall: No tenderness. Abdominal:      General: There is no distension. Palpations: Abdomen is soft. There is no hepatomegaly or splenomegaly. Tenderness: There is no abdominal tenderness. There is no guarding or rebound.    Musculoskeletal:         General: No tenderness or deformity. Normal range of motion. Cervical back: Normal range of motion and neck supple. Lymphadenopathy:      Cervical: No cervical adenopathy. Skin:     General: Skin is warm and dry. Coloration: Skin is not pale. Findings: No erythema or rash. Neurological:      Mental Status: She is alert and oriented to person, place, and time. Cranial Nerves: No cranial nerve deficit. Coordination: Coordination normal.      Deep Tendon Reflexes: Reflexes are normal and symmetric. Psychiatric:         Behavior: Behavior normal.         Thought Content: Thought content normal.         Judgment: Judgment normal.           Labs:  Lab Results   Component Value Date    WBC 4.4 03/12/2019    HGB 14.8 03/12/2019    HCT 41.3 03/12/2019    MCV 94.5 03/12/2019     03/12/2019     Lab Results   Component Value Date     08/12/2014    K 4.5 03/12/2019    CL 97 (L) 03/12/2019    CO2 31 03/12/2019    ANIONGAP 5 08/12/2014    BUN 14 03/12/2019    CREATININE 0.76 03/12/2019    GLUCOSE 103 07/14/2017    GLUF 86 04/09/2018    CALCIUM 10.2 03/12/2019    AST 25 03/12/2019    ALT 30 (H) 03/12/2019    ALKPHOS 56 03/12/2019    PROT 8.2 09/17/2013    BILITOT 0.6 09/17/2013    EGFR 97 04/09/2018     No results found for: "TSH"    Patient voiced understanding and agreement in the above discussion. Aware to contact our office with questions/symptoms in the interim.

## 2024-01-24 DIAGNOSIS — Z12.31 ENCOUNTER FOR SCREENING MAMMOGRAM FOR BREAST CANCER: Primary | ICD-10-CM

## 2024-01-29 ENCOUNTER — ANNUAL EXAM (OUTPATIENT)
Dept: OBGYN CLINIC | Facility: CLINIC | Age: 49
End: 2024-01-29
Payer: COMMERCIAL

## 2024-01-29 VITALS
SYSTOLIC BLOOD PRESSURE: 132 MMHG | HEIGHT: 66 IN | DIASTOLIC BLOOD PRESSURE: 90 MMHG | WEIGHT: 163 LBS | BODY MASS INDEX: 26.2 KG/M2

## 2024-01-29 DIAGNOSIS — Z85.3 PERSONAL HISTORY OF MALIGNANT NEOPLASM OF BREAST: ICD-10-CM

## 2024-01-29 DIAGNOSIS — Z01.419 ENCOUNTER FOR ANNUAL ROUTINE GYNECOLOGICAL EXAMINATION: Primary | ICD-10-CM

## 2024-01-29 DIAGNOSIS — Z15.01 BRCA1 GENE MUTATION POSITIVE: ICD-10-CM

## 2024-01-29 DIAGNOSIS — Z15.09 BRCA1 GENE MUTATION POSITIVE: ICD-10-CM

## 2024-01-29 DIAGNOSIS — Z90.13 HISTORY OF BILATERAL MASTECTOMY: ICD-10-CM

## 2024-01-29 PROCEDURE — S0612 ANNUAL GYNECOLOGICAL EXAMINA: HCPCS | Performed by: OBSTETRICS & GYNECOLOGY

## 2024-01-29 NOTE — PROGRESS NOTES
Assessment/Plan:  Pap smear deferred due to low risk status.  Encouraged self breast examination as well as calcium supplementation.  She will continue to follow-up with her breast specialist as scheduled.  Reviewed colon cancer screening, up-to-date.  She will continue to follow-up with primary care as scheduled.  Return to office in 1 year or as needed  No problem-specific Assessment & Plan notes found for this encounter.       Diagnoses and all orders for this visit:    Encounter for annual routine gynecological examination    BRCA1 gene mutation positive    Personal history of malignant neoplasm of breast    History of bilateral mastectomy          Subjective:      Patient ID: Razia Carrasquillo is a 48 y.o. female.    HPI    This is a pleasant 48-year-old female P2 ( x 2, age 17, 13) presents for her GYN exam.  She was diagnosed with breast cancer in , status post bilateral mastectomy, chemotherapy followed by radiation.  She underwent multiple reconstructive breast surgeries and completed 10-year course of Arimidex 3/2023.  She continues to follow-up with her breast surgeon on an annual basis.  She no longer has to follow-up with medical oncology.    She did test positive for BRCA 1 and ultimately underwent LAVH BSO 2013.  She denies any hot flashes or night sweats.  No changes in bowel or bladder function.  She has been in a monogamous relationship with her  for over 24 years.  Pap smears have been normal.    Colonoscopy 2024 f/u 3 yrs    [place] q year    12 yr JENNIFER, d/c from med onc    The following portions of the patient's history were reviewed and updated as appropriate: allergies, current medications, past family history, past medical history, past social history, past surgical history, and problem list.    Review of Systems   Constitutional:  Negative for fatigue, fever and unexpected weight change.   Respiratory:  Negative for cough, chest tightness, shortness of breath and wheezing.   "  Cardiovascular: Negative.  Negative for chest pain and palpitations.   Gastrointestinal: Negative.  Negative for abdominal distention, abdominal pain, blood in stool, constipation, diarrhea, nausea and vomiting.   Genitourinary: Negative.  Negative for difficulty urinating, dyspareunia, dysuria, flank pain, frequency, genital sores, hematuria, pelvic pain, urgency, vaginal bleeding, vaginal discharge and vaginal pain.   Skin:  Negative for rash.         Objective:      /90   Ht 5' 6\" (1.676 m)   Wt 73.9 kg (163 lb)   BMI 26.31 kg/m²          Physical Exam  Constitutional:       Appearance: Normal appearance. She is well-developed.   HENT:      Head: Normocephalic and atraumatic.   Cardiovascular:      Rate and Rhythm: Normal rate and regular rhythm.   Pulmonary:      Effort: Pulmonary effort is normal.      Breath sounds: Normal breath sounds.   Chest:   Breasts:     Right: Absent.      Left: Absent.   Abdominal:      General: Bowel sounds are normal. There is no distension.      Palpations: Abdomen is soft.      Tenderness: There is no abdominal tenderness. There is no guarding or rebound.   Genitourinary:     Labia:         Right: No rash, tenderness or lesion.         Left: No rash, tenderness or lesion.       Vagina: Normal. No signs of injury. No vaginal discharge or tenderness.      Uterus: Absent.       Adnexa:         Right: No mass, tenderness or fullness.          Left: No mass, tenderness or fullness.     Neurological:      Mental Status: She is alert.   Psychiatric:         Behavior: Behavior normal.         External genitalia is within normal limits.  The vagina is evident of slight estrogen deficiency.  The cervix is surgically absent.  The cuff is well supported.  "

## 2024-03-08 ENCOUNTER — TELEPHONE (OUTPATIENT)
Dept: HEMATOLOGY ONCOLOGY | Facility: CLINIC | Age: 49
End: 2024-03-08

## 2024-03-08 NOTE — TELEPHONE ENCOUNTER
Appointment Change  Cancel, Reschedule, Change to Virtual      Who are you speaking with? Patient   If it is not the patient, is the caller listed on the communication consent form? N/A   Which provider is the appointment scheduled with? JENNY Coleman   When was the original appointment scheduled?    Please list date and time 9/27/24    At which location is the appointment scheduled to take place? [place]   Was the appointment rescheduled?     Was the appointment changed from an in person visit to a virtual visit?    If so, please list the details of the change. 9/30/24 @ 930   What is the reason for the appointment change? Patient will be away       Was STAR transport scheduled? No   Does STAR transport need to be scheduled for the new visit (if applicable) No   Does the patient need an infusion appointment rescheduled? No   Does the patient have an upcoming infusion appointment scheduled? If so, when? No   Is the patient undergoing chemotherapy? No   For appointments cancelled with less than 24 hours:  Was the no-show policy reviewed? N/A

## 2024-09-30 ENCOUNTER — ONCOLOGY SURVIVORSHIP (OUTPATIENT)
Dept: SURGICAL ONCOLOGY | Facility: CLINIC | Age: 49
End: 2024-09-30
Payer: COMMERCIAL

## 2024-09-30 VITALS
DIASTOLIC BLOOD PRESSURE: 86 MMHG | HEIGHT: 66 IN | BODY MASS INDEX: 24.59 KG/M2 | HEART RATE: 77 BPM | WEIGHT: 153 LBS | SYSTOLIC BLOOD PRESSURE: 128 MMHG | TEMPERATURE: 97.5 F | RESPIRATION RATE: 16 BRPM | OXYGEN SATURATION: 97 %

## 2024-09-30 DIAGNOSIS — Z08 ENCOUNTER FOR FOLLOW-UP SURVEILLANCE OF BREAST CANCER: Primary | ICD-10-CM

## 2024-09-30 DIAGNOSIS — Z85.3 ENCOUNTER FOR FOLLOW-UP SURVEILLANCE OF BREAST CANCER: Primary | ICD-10-CM

## 2024-09-30 DIAGNOSIS — Z79.811 AROMATASE INHIBITOR USE: ICD-10-CM

## 2024-09-30 DIAGNOSIS — Z85.3 HISTORY OF LEFT BREAST CANCER: ICD-10-CM

## 2024-09-30 DIAGNOSIS — Z78.0 MENOPAUSE: ICD-10-CM

## 2024-09-30 PROCEDURE — 99213 OFFICE O/P EST LOW 20 MIN: CPT | Performed by: NURSE PRACTITIONER

## 2024-09-30 NOTE — PROGRESS NOTES
Assessment/Plan:    Diagnoses and all orders for this visit:    Encounter for follow-up surveillance of breast cancer    History of left breast cancer    Aromatase inhibitor use  -     DXA bone density spine hip and pelvis; Future    Menopause  -     DXA bone density spine hip and pelvis; Future    Patient is a 49-year-old female that was diagnosed with a left-sided breast cancer in March 2012.  She underwent genetic testing which revealed a BRCA1 genetic mutation.  Her pathology revealed invasive ductal carcinoma, ER 60%, VA 35%, HER2 negative.  She completed neoadjuvant chemotherapy and underwent a left modified radical mastectomy and a right prophylactic mastectomy with Dr. Moscoso.  She had 1/12 positive lymph nodes.  Tumor was high risk on MammaPrint.  She completed postmastectomy radiation therapy and 10 years of adjuvant endocrine therapy. She is now on observation. She offers no new complaints today.  She reports that she will occasionally have some stiffness in her left axillary region.  This comes and goes.  No worrisome findings on today's clinical exam. I encouraged her to massage the area when it occurs and to contact me with persistent symptoms or changes on self exam. She is up-to-date on colorectal cancer screening and has undergone a total hysterectomy.  Order placed for DEXA scan given menopausal state, aromatase inhibitor use.  We discussed the option of meeting with cardio oncology secondary to her history of anthracycline based chemotherapy and left chest wall radiation therapy.  She will think about this and will contact me if she is interested in a referral.  Otherwise I will plan to see her back in 1 year for a follow-up survivorship visit or sooner should the need arise.  She was instructed to contact us with any changes or concerns in the interim.  All of her questions were answered today.    REASON FOR VISIT:   Survivorship      Previous therapy:  Oncology History   History of left breast  cancer   2/1/2012 Biopsy    Left breast biopsy  Invasive ductal carcinoma  ER 60-70%  [place] 35-40%  HER-2 negative     2/9/2012 Genetic Testing    POSITIVE BRCA 1 mutation     2/10/2012 Genomic Testing    Mammaprint High Risk      - 7/2012 Chemotherapy    Neoadjuvant chemotherapy with dose-dense Adriamycin, Cyclophosphamide x4 followed by weekly paclitaxel (Dr. Begum)     8/28/2012 Surgery    Bilateral mastectomies, Bilateral sentinel lymph node biopsy, Left axillary dissection, Removal port-a-cath (Dr. Moscoso)    Breast reconstruction (Dr Loya)      - 12/7/2012 Radiation    Postmastectomy radiation therapy (Dr. Wang)     9/22/2012 Surgery    Removal left breast implant     10/2012 -  Hormone Therapy    Adjuvant hormonal therapy with tamoxifen 20 mg once a day since October 2012 through early February 2013.     Anastrozole February 2013- present     2/1/2013 Surgery    Prophylactic bilateral oophorectomy and hysterectomy  (Dr. Hill)      Surgery       10/24/2013 Surgery    Left TRAM flap reconstruction     1/30/2014 Surgery    Revision of TRAM flap reconstruction     5/1/2014 Surgery    Right breast expander/implant exchange, Left breast implant     8/28/2014 Surgery    Revision left breast mound with fat transfer, left nipple reconstruction, revision abdominal scar           Patient ID: Razia Carrasquillo is a 49 y.o. female  Presenting today for a follow-up breast cancer survivorship visit.  She has not appreciated any changes on self-exam.  She reports some intermittent stiffness in her left axillary region.  She denies persistent headaches, back pain or bone pain, cough or shortness of breath, abdominal pain.          Review of Systems   Constitutional:  Negative for activity change, appetite change, chills, fatigue, fever and unexpected weight change.   Respiratory:  Negative for cough and shortness of breath.    Cardiovascular:  Negative for chest pain.   Gastrointestinal:  Negative for abdominal pain,  "constipation, diarrhea, nausea and vomiting.   Musculoskeletal:  Negative for arthralgias, back pain, gait problem and myalgias.   Skin:  Negative for color change and rash.   Neurological:  Negative for dizziness and headaches.   Hematological:  Negative for adenopathy.   Psychiatric/Behavioral:  Negative for agitation and confusion.    All other systems reviewed and are negative.      Objective:    Blood pressure 128/86, pulse 77, temperature 97.5 °F (36.4 °C), temperature source Temporal, resp. rate 16, height 5' 6\" (1.676 m), weight 69.4 kg (153 lb), SpO2 97%, not currently breastfeeding.  Body mass index is 24.69 kg/m².      Physical Exam  Vitals reviewed.   Constitutional:       General: She is not in acute distress.     Appearance: Normal appearance. She is well-developed. She is not diaphoretic.   HENT:      Head: Normocephalic and atraumatic.   Cardiovascular:      Rate and Rhythm: Normal rate and regular rhythm.      Heart sounds: Normal heart sounds.   Pulmonary:      Effort: Pulmonary effort is normal.      Breath sounds: Normal breath sounds.   Chest:      Comments: Bilateral reconstructed breasts with implants present. There is stable nodularity along the superior aspect of the left implant- fat necrosis/cysts. No other dominant masses, skin changes or nodules.  Abdominal:      Palpations: Abdomen is soft. There is no mass.      Tenderness: There is no abdominal tenderness.   Musculoskeletal:         General: Normal range of motion.      Cervical back: Normal range of motion.   Lymphadenopathy:      Upper Body:      Right upper body: No supraclavicular or axillary adenopathy.      Left upper body: No supraclavicular or axillary adenopathy.   Skin:     General: Skin is warm and dry.      Findings: No rash.   Neurological:      Mental Status: She is alert and oriented to person, place, and time.   Psychiatric:         Speech: Speech normal.         Discussed symptoms related to disease recurrence, " Yes    Evaluated for late effects related to cancer treatment, Yes    Screening current for cervical cancer, Yes, describe: s/p hysterectomy    Screening current for colon cancer, Yes, describe: up to date, q 3 years    Cancer rehabilitation services addressed, Yes, describe: declines at this time- continues with [employer]    Screening current for osteoporosis, No, order placed

## 2024-10-04 ENCOUNTER — TRANSCRIBE ORDERS (OUTPATIENT)
Facility: HOSPITAL | Age: 49
End: 2024-10-04

## 2024-10-04 DIAGNOSIS — Z00.6 ENCOUNTER FOR EXAMINATION FOR NORMAL COMPARISON OR CONTROL IN CLINICAL RESEARCH PROGRAM: Primary | ICD-10-CM

## 2024-12-09 ENCOUNTER — RESULTS FOLLOW-UP (OUTPATIENT)
Facility: HOSPITAL | Age: 49
End: 2024-12-09

## 2024-12-09 NOTE — TELEPHONE ENCOUNTER
12/10/2024: Spoke to Razia about her HBOC results from the DNA [employer] study. Razia was already aware that she had the variant and is under care for it. She also visited with genetic counseling approximately a year ago. She declined genetic counseling at this time. Razia was informed that she should contact [facility] at [phone] if she changes her mind about genetic counseling.    12/9/2024: Left a voicemail for Razia regarding results from the [employer] research study. First Attempt.

## 2025-01-29 ENCOUNTER — APPOINTMENT (OUTPATIENT)
Dept: RADIOLOGY | Facility: MEDICAL CENTER | Age: 50
End: 2025-01-29
Payer: COMMERCIAL

## 2025-01-29 ENCOUNTER — OFFICE VISIT (OUTPATIENT)
Dept: OBGYN CLINIC | Facility: MEDICAL CENTER | Age: 50
End: 2025-01-29
Payer: COMMERCIAL

## 2025-01-29 VITALS — HEIGHT: 66 IN | WEIGHT: 162 LBS | BODY MASS INDEX: 26.03 KG/M2

## 2025-01-29 DIAGNOSIS — S92.252A CLOSED AVULSION FRACTURE OF NAVICULAR BONE OF LEFT FOOT, INITIAL ENCOUNTER: ICD-10-CM

## 2025-01-29 DIAGNOSIS — S93.492A SPRAIN OF ANTERIOR TALOFIBULAR LIGAMENT OF LEFT ANKLE, INITIAL ENCOUNTER: Primary | ICD-10-CM

## 2025-01-29 DIAGNOSIS — M25.572 PAIN, JOINT, ANKLE AND FOOT, LEFT: ICD-10-CM

## 2025-01-29 DIAGNOSIS — S93.422A SPRAIN OF DELTOID LIGAMENT OF LEFT ANKLE, INITIAL ENCOUNTER: ICD-10-CM

## 2025-01-29 PROCEDURE — 73610 X-RAY EXAM OF ANKLE: CPT

## 2025-01-29 PROCEDURE — 73630 X-RAY EXAM OF FOOT: CPT

## 2025-01-29 PROCEDURE — 99203 OFFICE O/P NEW LOW 30 MIN: CPT | Performed by: EMERGENCY MEDICINE

## 2025-01-29 NOTE — PATIENT INSTRUCTIONS
You may use Advil (ibuprofen) 400-600mg every 6 hours or at least twice per day OR Aleve (naproxen) 250-500mg every 12 hours as needed for pain and inflammation.    You may also take Tylenol 500mg every 4-6 hours as needed OR max 1,000mg per dose up to 3 times per day for a total of 3,000mg per day  Check with your primary care physician to see if these medications are safe to take and to make sure they do not interfere with your other medications and medical issues.      You may weight-bear as tolerated in the walking boot, and may take the boot off when at home relaxing or sleeping.  As symptoms improve you may begin to ambulate out of the walking boot and may transition into a supportive shoe or sneaker with or without an ankle brace.  You may perform the home exercises daily and progress as tolerated.  Generally these injuries take about 4 to 8 weeks to get to the point where you feel close to 100%.  You may return to athletic activities when you feel close to 100% and have near full range of motion and strength.        Ankle Sprain   AMBULATORY CARE:   An ankle sprain  happens when 1 or more ligaments in your ankle joint stretch or tear. Ligaments are tough tissues that connect bones. Ligaments support your joints and keep your bones in place.  Common symptoms include the following:   Trouble moving your ankle or foot    Pain when you touch or put weight on your ankle    Bruised, swollen, or misshapen ankle  Seek care immediately if:   You have severe pain in your ankle.    Your foot or toes are cold or numb.    Your ankle becomes more weak or unstable (wobbly).    You are unable to put any weight on your ankle or foot.    Your swelling has increased or returned.  Contact your healthcare provider if:   Your pain does not go away, even after treatment.    You have questions or concerns about your condition or care.  Treatment:   Medicines      NSAIDs , such as ibuprofen, help decrease swelling, pain, and fever.  This medicine is available with or without a doctor's order. NSAIDs can cause stomach bleeding or kidney problems in certain people. If you take blood thinner medicine, always ask your healthcare provider if NSAIDs are safe for you. Always read the medicine label and follow directions.    Acetaminophen  decreases pain. It is available without a doctor's order. Ask how much to take and how often to take it. Follow directions. Acetaminophen can cause liver damage if not taken correctly.    Prescription pain medicine  may be given. Ask how to take this medicine safely.    Surgery  may be needed to repair or replace a torn ligament if your sprain does not heal with other treatments. Your healthcare provider may use screws to attach the bones in your ankle together. The screws may help support your ankle and make it stable. Ask your healthcare provider for more information about surgery to treat your ankle sprain.  Self care:   Use support devices,  such as a brace, cast, or splint, may be needed to limit your movement and protect your joint. You may need to use crutches to decrease your pain as you move around.     Go to physical therapy as directed.  A physical therapist teaches you exercises to help improve movement and strength, and to decrease pain.    Rest  your ankle so that it can heal. Return to normal activities as directed.    Apply ice on your ankle for 15 to 20 minutes every hour or as directed. Use an ice pack, or put crushed ice in a plastic bag. Cover it with a towel. Ice helps prevent tissue damage and decreases swelling and pain.    Compress  your ankle. Ask if you should wrap an elastic bandage around your injured ligament. An elastic bandage provides support and helps decrease swelling and movement so your joint can heal. Wear as long as directed.    Elevate  your ankle above the level of your heart as often as you can. This will help decrease swelling and pain. Prop your ankle on pillows or blankets  to keep it elevated comfortably.       Prevent another ankle sprain:   Let your ankle heal.  Find out how long your ligament needs to heal. Do not do any physical activity until your healthcare provider says it is okay. If you start activity too soon, you may develop a more serious injury.    Always warm up and stretch  before you exercise or play sports.    Use the right equipment.  Always wear shoes that fit well and are made for the activity that you are doing. You may also need ankle supports, elbow and knee pads, or braces.  Follow up with your healthcare provider as directed:  Write down your questions so you remember to ask them during your visits.   © 2017 [employer] Information is for End User's use only and may not be sold, redistributed or otherwise used for commercial purposes. All illustrations and images included in CareNotes® are the copyrighted property of [employer] or [employer].  The above information is an  only. It is not intended as medical advice for individual conditions or treatments. Talk to your doctor, nurse or pharmacist before following any medical regimen to see if it is safe and effective for you.      Ankle Exercises   AMBULATORY CARE:   What you need to know about ankle exercises:  Ankle exercises help strengthen your ankle and improve its function after injury. These are beginning exercises. Ask your healthcare provider if you need to see a physical therapist for more advanced exercises.  Do these exercises 3 to 5 days a week , or as directed by your healthcare provider. Ask if you should perform the exercises on each ankle.     Do the exercises in the order that your healthcare provider recommends.  This will help prevent swelling, chronic pain, and reinjury. Start with range of motion exercises. Then progress to strengthening exercises, and finally to balancing exercises.    Warm up before you do ankle exercises.  Walk or ride a  stationary bike for 5 to 10 minutes to prepare your ankle for movement.    Stop if you feel pain.  It is normal to feel some discomfort at first. Regular exercise will help decrease your discomfort over time.  How to perform range of motion exercises safely:  Begin with range of motion exercises to improve flexibility. Ask your healthcare provider when you can progress to strengthening exercises.  Ankle alphabet:  Sit on a chair so that your feet do not touch the floor. Use your big toe to write each letter of the alphabet. Use only your foot and ankle, and keep your movements small. Do 2 sets.           Calf stretches:      Sitting calf stretches with a towel:  Sit on the floor with both legs out straight in front of you. Loop a towel around the ball of your injured foot. Grasp the ends of the towel and pull it toward you. Keep your leg and back straight. Do not lean forward as you pull the towel. Hold for 30 seconds. Then relax for 30 seconds. Do 2 sets of 10.           Standing calf stretches:  Stand facing a wall with the foot that is not injured forward and your knee slightly bent. Keep the leg with the injured foot straight and behind you with your toes pointed in slightly. With both heels flat on the floor, press your hips forward. Do not arch your back. Hold for 30 seconds, and then relax for 30 seconds. Do 2 sets of 10. Repeat with your leg bent. Do 2 sets of 10.       How to perform strengthening exercises safely:  After you can perform range of motion exercises without pain, you may begin strengthening exercises. Ask your healthcare provider when you can progress to balancing exercises.  Ankle movement in 4 directions:  Sit on the floor with your legs straight in front of you. Keep your heels on the floor for support.    Dorsiflexion:  Begin with your toes pointing straight up. Pull your toes toward your body. Slowly return to the starting position. Do 3 sets of 5.     Plantar flexion:  Begin with your  toes pointing straight up. Push your toes away from your body. Slowly return to the starting position. Do 3 sets of 5.           Inversion:  Begin with your toes pointing straight up. Push your toes inward, toward each other. Slowly return to the starting position. Do 3 sets of 5.     Eversion:  Begin with your toes pointing straight up. Push your toes outward, away from each other. Slowly return to the starting position. Do 3 sets of 5.         Toe curls with a towel:  Sit on a chair so that both of your feet are flat on the floor. Place a small towel on the floor in front of your injured foot. Grab the center of the towel with your toes and curl the towel toward you. Relax and repeat. Do 1 set of 5.           [place] pick-ups:  Sit on a chair so that both of your feet are flat on the floor. Place 20 marbles on the floor in front of your injured foot. Use your toes to  one marble at a time and place it into a bowl. Repeat until you have picked up all the marbles. Do 1 set.     Heel raises:      Single leg heel raises:  Stand with your weight evenly on both feet. Hold on to a chair or a wall for balance. Lift the foot that is not injured off the floor so all your weight is placed on your injured foot. Raise the heel of your injured foot as high as you can. Slowly lower your heel to the floor. Do 1 set of 10.           Double leg heel raises:  Stand with your weight evenly on both feet. Hold on to a chair or a wall for balance. Raise both of your heels as high as you can. Slowly lower your heels to the floor. Do 1 set of 10.    Heel and toe walks:      Heel walks:  Begin in a standing position. Lift your toes off the floor and walk on your heels. Keep your toes lifted as high as possible. Do 2 sets of 10.           Toe walks:  Begin in a standing position. Lift your heels off the floor and walk on the balls and toes of your feet. Keep your heels lifted as high as possible. Do 2 sets of 10.       How to perform  a balance exercise safely:  After you can perform strengthening exercises without pain, you may do this beginning balancing exercise. Ask your healthcare provider for more advanced balance exercises.  Single leg stance:  Stand with your weight evenly on both feet, or hold on to a chair or a wall. Do not lean to the side. Lift the foot that is not injured off the floor so all your weight is placed on your injured foot. Balance on your injured foot. Ask your healthcare provider how long to hold this position.           Contact your healthcare provider if:   Your pain becomes worse.    You have new pain.    You have questions or concerns about your condition, care, or exercise program.  © 2017 [employer] Information is for End User's use only and may not be sold, redistributed or otherwise used for commercial purposes. All illustrations and images included in CareNotes® are the copyrighted property of [employer] or [employer].  The above information is an  only. It is not intended as medical advice for individual conditions or treatments. Talk to your doctor, nurse or pharmacist before following any medical regimen to see if it is safe and effective for you.

## 2025-01-29 NOTE — PROGRESS NOTES
Assessment/Plan:    Diagnoses and all orders for this visit:    Sprain of anterior talofibular ligament of left ankle, initial encounter  -     XR foot 3+ vw left; Future  -     XR ankle 3+ vw left; Future    Sprain of deltoid ligament of left ankle, initial encounter    Closed avulsion fracture of navicular bone of left foot, initial encounter    Obtained and reviewed x-rays of the left foot and ankle with possible dorsal navicular avulsion fracture.  She may continue to weight-bear as tolerated with the use of the cam boot she may transition out of that into an ankle brace as tolerated.  I have provided home exercises for her to perform  We discussed that these injuries may take 4 to 8 weeks on average to heal but as symptoms improve she may progress with her activities.  She does have crutches at home she may use if desired.    Return in about 4 weeks (around 2/26/2025).      Subjective:   Patient ID: Razia Carrasquillo is a 49 y.o. female.    Razia presents for left ankle inversion injury after missing a step at the bottom of the stairs she notes lateral pain and swelling.  She has been utilizing a cam boot and has noticed improvement of her symptoms.        Review of Systems    The following portions of the patient's chart were reviewed and updated as appropriate:   Allergy:    Allergies   Allergen Reactions    Sulfamethoxazole Hives    Trimethoprim Hives    Bacitracin Facial Swelling, Hives, Itching, Rash and Swelling    Bactrim [Sulfamethoxazole-Trimethoprim] Rash     Other reaction(s): rash    Hydrocortisone Butyrate Facial Swelling, Hives, Itching, Rash and Swelling       Medications:    Current Outpatient Medications:     CALCIUM PO, Take 1,000 Units by mouth daily, Disp: , Rfl:     cholecalciferol (VITAMIN D3) 1,000 units tablet, Take 1,000 Units by mouth daily, Disp: , Rfl:     Multiple Vitamins-Minerals (MULTI COMPLETE PO), Take 1 tablet by mouth daily, Disp: , Rfl:     rosuvastatin (CRESTOR) 5 mg  "tablet, Take by mouth Daily, Disp: , Rfl:     venlafaxine (EFFEXOR-XR) 37.5 mg 24 hr capsule, Take 37.5 mg by mouth daily Take with food, Disp: , Rfl:     anastrozole (ARIMIDEX) 1 mg tablet, Take 1 tablet by mouth once daily, Disp: 90 tablet, Rfl: 1    Patient Active Problem List   Diagnosis    History of left breast cancer    Abnormal glucose    Anxiety state    Female hypogonadism due to aromatase inhibitor therapy    Leukocytopenia    Migraine without aura    Preventative health care    BRCA1 gene mutation positive    Abscess involving suture    Encounter for follow-up surveillance of breast cancer       Objective:  Ht 5' 6\" (1.676 m)   Wt 73.5 kg (162 lb)   BMI 26.15 kg/m²     Left Ankle Exam     Tenderness   The patient is experiencing tenderness in the ATF and deltoid (Diffuse tenderness to palpation of the anterior ankle and dorsal aspect of the foot).   Swelling: moderate    Range of Motion   Dorsiflexion:  abnormal   Eversion:  abnormal   Inversion:  abnormal     Other   Erythema: absent  Sensation: normal  Pulse: present    Comments:  There is no tenderness to palpation of the lisfranc, and no plantar ecchymosis.  There is no tenderness to palpation of the proximal and mid fibula.                  Physical Exam      Neurologic Exam    Procedures    I have personally reviewed pertinent films in PACS and my interpretation is x-rays left ankle and foot showing small lucency of the dorsal aspect of the navicular bone possible avulsion type fracture otherwise there are no fractures evidence of dislocation or displacement.            Past Medical History:   Diagnosis Date    BRCA1 positive     Breast cancer (HCC) 03/2018    History of chemotherapy        Past Surgical History:   Procedure Laterality Date    HYSTERECTOMY      MASTECTOMY Bilateral     OOPHORECTOMY Bilateral     RECONSTRUCTION BREAST W/ TRAM FLAP         Social History     Socioeconomic History    Marital status: /Civil Union     Spouse " name: Not on file    Number of children: Not on file    Years of education: Not on file    Highest education level: Not on file   Occupational History    Not on file   Tobacco Use    Smoking status: Former    Smokeless tobacco: Never   Vaping Use    Vaping status: Never Used   Substance and Sexual Activity    Alcohol use: Yes    Drug use: No    Sexual activity: Yes     Partners: Male     Birth control/protection: Surgical   Other Topics Concern    Not on file   Social History Narrative    Not on file     Social Drivers of Health     Financial Resource Strain: Not on file   Food Insecurity: Not on file   Transportation Needs: Not on file   Physical Activity: Not on file   Stress: Not on file   Social Connections: Not on file   Intimate Partner Violence: Not on file   Housing Stability: Not on file       Family History   Problem Relation Age of Onset    Ovarian cancer Mother     Breast cancer Maternal Aunt

## 2025-02-26 ENCOUNTER — OFFICE VISIT (OUTPATIENT)
Dept: OBGYN CLINIC | Facility: MEDICAL CENTER | Age: 50
End: 2025-02-26
Payer: COMMERCIAL

## 2025-02-26 VITALS — WEIGHT: 162 LBS | BODY MASS INDEX: 26.03 KG/M2 | HEIGHT: 66 IN

## 2025-02-26 DIAGNOSIS — S93.422A SPRAIN OF DELTOID LIGAMENT OF LEFT ANKLE, INITIAL ENCOUNTER: ICD-10-CM

## 2025-02-26 DIAGNOSIS — S92.252A CLOSED AVULSION FRACTURE OF NAVICULAR BONE OF LEFT FOOT, INITIAL ENCOUNTER: ICD-10-CM

## 2025-02-26 DIAGNOSIS — S93.492A SPRAIN OF ANTERIOR TALOFIBULAR LIGAMENT OF LEFT ANKLE, INITIAL ENCOUNTER: Primary | ICD-10-CM

## 2025-02-26 PROCEDURE — 99213 OFFICE O/P EST LOW 20 MIN: CPT | Performed by: EMERGENCY MEDICINE

## 2025-02-26 RX ORDER — HYDROXYZINE HYDROCHLORIDE 25 MG/1
TABLET, FILM COATED ORAL
COMMUNITY
Start: 2024-11-20

## 2025-02-26 NOTE — PROGRESS NOTES
Assessment/Plan:    Diagnoses and all orders for this visit:    Sprain of anterior talofibular ligament of left ankle, initial encounter    Sprain of deltoid ligament of left ankle, initial encounter    Closed avulsion fracture of navicular bone of left foot, initial encounter    Other orders  -     Multiple Vitamin (MULTI-VITAMIN) tablet; Take by mouth  -     hydrOXYzine HCL (ATARAX) 25 mg tablet; take 1 tablet by oral route  every 8 hours as needed for itching    Razia is doing very well considering she is only 1 month out from her injury.  Discussed weaning back into activities using pain as her guide would recommend an ankle brace.  Continue home exercise for strengthening.  Will hold off on formal PT    Return if symptoms worsen or fail to improve.      Subjective:   Patient ID: Razia Carrasquillo is a 49 y.o. female.    Razia returns with improvement of symptoms overall since last evaluation.  She has been able to wear shoes and sneakers she notes mild symptoms of discomfort or pain in mostly the anterior aspect of the ankle with overlying swelling.  She has concerns about the continued swelling of the ankle.  She is interested in returning to exercise and her boot camp classes.  Denies mechanical symptoms or instability    Initial note: Razia presents for left ankle inversion injury after missing a step at the bottom of the stairs she notes lateral pain and swelling.  She has been utilizing a cam boot and has noticed improvement of her symptoms.        Review of Systems    The following portions of the patient's chart were reviewed and updated as appropriate:   Allergy:    Allergies   Allergen Reactions    Sulfamethoxazole Hives    Trimethoprim Hives    Bacitracin Facial Swelling, Hives, Itching, Rash and Swelling    Hydrocortisone Butyrate Facial Swelling, Hives, Itching, Rash and Swelling    Sulfamethoxazole-Trimethoprim Rash and Hives     Other reaction(s): rash       Medications:    Current Outpatient  "Medications:     CALCIUM PO, Take 1,000 Units by mouth daily, Disp: , Rfl:     cholecalciferol (VITAMIN D3) 1,000 units tablet, Take 1,000 Units by mouth daily, Disp: , Rfl:     hydrOXYzine HCL (ATARAX) 25 mg tablet, take 1 tablet by oral route  every 8 hours as needed for itching, Disp: , Rfl:     Multiple Vitamin (MULTI-VITAMIN) tablet, Take by mouth, Disp: , Rfl:     Multiple Vitamins-Minerals (MULTI COMPLETE PO), Take 1 tablet by mouth daily, Disp: , Rfl:     rosuvastatin (CRESTOR) 5 mg tablet, Take by mouth Daily, Disp: , Rfl:     venlafaxine (EFFEXOR-XR) 37.5 mg 24 hr capsule, Take 37.5 mg by mouth daily Take with food, Disp: , Rfl:     anastrozole (ARIMIDEX) 1 mg tablet, Take 1 tablet by mouth once daily, Disp: 90 tablet, Rfl: 1    Patient Active Problem List   Diagnosis    History of left breast cancer    Abnormal glucose    Anxiety state    Female hypogonadism due to aromatase inhibitor therapy    Leukocytopenia    Migraine without aura    Preventative health care    BRCA1 gene mutation positive    Abscess involving suture    Encounter for follow-up surveillance of breast cancer       Objective:  Ht 5' 6\" (1.676 m)   Wt 73.5 kg (162 lb)   BMI 26.15 kg/m²     Left Ankle Exam     Range of Motion   The patient has normal left ankle ROM.     Muscle Strength   The patient has normal left ankle strength.    Tests   Varus tilt: negative    Other   Erythema: absent  Sensation: normal  Pulse: present    Comments:  Mild swelling overlying the dorsal proximal foot into the ankle with tenderness to palpation.  This is overlying the area of possible navicular avulsion fracture seen on x-ray.          Strength/Myotome Testing     Left Ankle/Foot   Normal strength      Physical Exam      Neurologic Exam    Procedures    I have personally reviewed the written report of the pertinent studies.   X-ray ankle and foot          Past Medical History:   Diagnosis Date    BRCA1 positive     Breast cancer (HCC) 03/2018    History " of chemotherapy        Past Surgical History:   Procedure Laterality Date    HYSTERECTOMY      MASTECTOMY Bilateral     OOPHORECTOMY Bilateral     RECONSTRUCTION BREAST W/ TRAM FLAP         Social History     Socioeconomic History    Marital status: /Civil Union     Spouse name: Not on file    Number of children: Not on file    Years of education: Not on file    Highest education level: Not on file   Occupational History    Not on file   Tobacco Use    Smoking status: Former    Smokeless tobacco: Never   Vaping Use    Vaping status: Never Used   Substance and Sexual Activity    Alcohol use: Yes    Drug use: No    Sexual activity: Yes     Partners: Male     Birth control/protection: Surgical   Other Topics Concern    Not on file   Social History Narrative    Not on file     Social Drivers of Health     Financial Resource Strain: Not on file   Food Insecurity: Not on file   Transportation Needs: Not on file   Physical Activity: Not on file   Stress: Not on file   Social Connections: Not on file   Intimate Partner Violence: Not on file   Housing Stability: Not on file       Family History   Problem Relation Age of Onset    Ovarian cancer Mother     Breast cancer Maternal Aunt

## 2025-03-15 ENCOUNTER — HOSPITAL ENCOUNTER (OUTPATIENT)
Dept: RADIOLOGY | Age: 50
Discharge: HOME | End: 2025-03-15
Payer: COMMERCIAL

## 2025-03-15 VITALS — HEIGHT: 66 IN | BODY MASS INDEX: 26.36 KG/M2 | WEIGHT: 164 LBS

## 2025-03-15 DIAGNOSIS — Z79.811 AROMATASE INHIBITOR USE: ICD-10-CM

## 2025-03-15 DIAGNOSIS — Z78.0 MENOPAUSE: ICD-10-CM

## 2025-03-15 PROCEDURE — 77080 DXA BONE DENSITY AXIAL: CPT

## 2025-03-17 ENCOUNTER — RESULTS FOLLOW-UP (OUTPATIENT)
Dept: SURGICAL ONCOLOGY | Facility: CLINIC | Age: 50
End: 2025-03-17

## 2025-03-27 ENCOUNTER — ANNUAL EXAM (OUTPATIENT)
Dept: OBGYN CLINIC | Facility: CLINIC | Age: 50
End: 2025-03-27
Payer: COMMERCIAL

## 2025-03-27 VITALS
BODY MASS INDEX: 26.52 KG/M2 | WEIGHT: 165 LBS | HEIGHT: 66 IN | DIASTOLIC BLOOD PRESSURE: 88 MMHG | SYSTOLIC BLOOD PRESSURE: 142 MMHG

## 2025-03-27 DIAGNOSIS — Z85.3 PERSONAL HISTORY OF MALIGNANT NEOPLASM OF BREAST: ICD-10-CM

## 2025-03-27 DIAGNOSIS — Z90.13 HISTORY OF BILATERAL MASTECTOMY: ICD-10-CM

## 2025-03-27 DIAGNOSIS — Z15.01 BRCA1 GENE MUTATION POSITIVE: ICD-10-CM

## 2025-03-27 DIAGNOSIS — N95.2 VAGINAL ATROPHY: ICD-10-CM

## 2025-03-27 DIAGNOSIS — Z01.419 ENCOUNTER FOR ANNUAL ROUTINE GYNECOLOGICAL EXAMINATION: Primary | ICD-10-CM

## 2025-03-27 DIAGNOSIS — Z15.09 BRCA1 GENE MUTATION POSITIVE: ICD-10-CM

## 2025-03-27 PROCEDURE — S0612 ANNUAL GYNECOLOGICAL EXAMINA: HCPCS | Performed by: OBSTETRICS & GYNECOLOGY

## 2025-03-27 NOTE — PROGRESS NOTES
Name: Razia Carrasquillo      : 1975      MRN: [id number]  Encounter Provider: Kathy Choi DO  Encounter Date: 3/27/2025   Encounter department: [facility]  :  Assessment & Plan  Encounter for annual routine gynecological examination         Personal history of malignant neoplasm of breast         History of bilateral mastectomy         BRCA1 gene mutation positive         Vaginal atrophy       Pap smear deferred due to low risk status.  Encouraged self breast examination as well as calcium supplementation.  She will continue to follow-up with her breast specialist on an annual basis.  Reviewed colon cancer screening, up to date.  Discussed treatment for vaginal atrophy.  Continue, restart vaginal moisturizer 2 times per week.  She will continue to follow-up with primary care as scheduled.  Return to office in 1 year or as needed      History of Present Illness   HPI  Razia Carrasquillo is a 49 y.o. female who presents     This is a pleasant 49-year-old female P2 ( x 2, age 18, 14) presents for her GYN exam.  She was diagnosed with breast cancer , status post bilateral mastectomy, chemotherapy followed by radiation.  She underwent multiple breast surgeries and completed a 10-year course of Arimidex 3/2023.  She continues to follow-up with the breast specialist once a year.  She underwent [facility] BSO 2013 risk-reducing surgery, positive BRCA gene.  She has never been on hormones.  She has been in a monogamous relationship with her  for over 25 years.  Pap smears have been normal.    Colonoscopy 2024 follow-up 3 years    Routine labs on an annual basis  History obtained from: patient    Review of Systems  Current Outpatient Medications on File Prior to Visit   Medication Sig Dispense Refill    CALCIUM PO Take 1,000 Units by mouth daily      cholecalciferol (VITAMIN D3) 1,000 units tablet Take 1,000 Units by mouth daily      Multiple Vitamin (MULTI-VITAMIN) tablet Take by mouth       "rosuvastatin (CRESTOR) 5 mg tablet Take by mouth Daily      venlafaxine (EFFEXOR-XR) 37.5 mg 24 hr capsule Take 37.5 mg by mouth daily Take with food      anastrozole (ARIMIDEX) 1 mg tablet Take 1 tablet by mouth once daily 90 tablet 1    hydrOXYzine HCL (ATARAX) 25 mg tablet take 1 tablet by oral route  every 8 hours as needed for itching      Multiple Vitamins-Minerals (MULTI COMPLETE PO) Take 1 tablet by mouth daily (Patient not taking: Reported on 3/27/2025)       No current facility-administered medications on file prior to visit.         Objective   /88   Ht 5' 6\" (1.676 m)   Wt 74.8 kg (165 lb)   BMI 26.63 kg/m²      Physical Exam  Constitutional:       Appearance: Normal appearance. She is well-developed.   HENT:      Head: Normocephalic and atraumatic.   Cardiovascular:      Rate and Rhythm: Normal rate and regular rhythm.   Pulmonary:      Effort: Pulmonary effort is normal.      Breath sounds: Normal breath sounds.   Chest:   Breasts:     Right: No inverted nipple, mass, nipple discharge, skin change or tenderness.      Left: No inverted nipple, mass, nipple discharge, skin change or tenderness.   Abdominal:      General: Bowel sounds are normal. There is no distension.      Palpations: Abdomen is soft.      Tenderness: There is no abdominal tenderness. There is no guarding or rebound.   Genitourinary:     Labia:         Right: No rash, tenderness or lesion.         Left: No rash, tenderness or lesion.       Vagina: Normal. No signs of injury. No vaginal discharge or tenderness.      Uterus: Absent.       Adnexa:         Right: No mass, tenderness or fullness.          Left: No mass, tenderness or fullness.     Neurological:      Mental Status: She is alert.   Psychiatric:         Behavior: Behavior normal.     External genitalia is within normal limits.  The vagina is evident of estrogen deficiency.  The cervix is surgically absent.  The cuff is well supported.    Administrative Statements   I " have spent a total time of 30 minutes in caring for this patient on the day of the visit/encounter including Instructions for management, Impressions, Counseling / Coordination of care, Documenting in the medical record, Reviewing/placing orders in the medical record (including tests, medications, and/or procedures), and Obtaining or reviewing history  .